# Patient Record
Sex: MALE | Race: WHITE | URBAN - METROPOLITAN AREA
[De-identification: names, ages, dates, MRNs, and addresses within clinical notes are randomized per-mention and may not be internally consistent; named-entity substitution may affect disease eponyms.]

---

## 2018-02-16 ENCOUNTER — TELEPHONE (OUTPATIENT)
Dept: CARDIOLOGY CLINIC | Age: 48
End: 2018-02-16

## 2018-02-19 ENCOUNTER — OFFICE VISIT (OUTPATIENT)
Dept: CARDIOLOGY CLINIC | Age: 48
End: 2018-02-19

## 2018-02-19 VITALS
BODY MASS INDEX: 43.82 KG/M2 | HEIGHT: 71 IN | HEART RATE: 81 BPM | SYSTOLIC BLOOD PRESSURE: 108 MMHG | DIASTOLIC BLOOD PRESSURE: 68 MMHG | OXYGEN SATURATION: 94 % | WEIGHT: 313 LBS

## 2018-02-19 DIAGNOSIS — R07.9 CHEST PAIN, UNSPECIFIED TYPE: ICD-10-CM

## 2018-02-19 DIAGNOSIS — R06.83 SNORING: ICD-10-CM

## 2018-02-19 DIAGNOSIS — R60.0 LOCALIZED EDEMA: Primary | ICD-10-CM

## 2018-02-19 DIAGNOSIS — I15.9 SECONDARY HYPERTENSION: ICD-10-CM

## 2018-02-19 PROCEDURE — 99204 OFFICE O/P NEW MOD 45 MIN: CPT | Performed by: INTERNAL MEDICINE

## 2018-02-19 RX ORDER — HYDROCHLOROTHIAZIDE 12.5 MG/1
12.5 TABLET ORAL DAILY
COMMUNITY

## 2018-02-19 RX ORDER — CHLORAL HYDRATE 500 MG
3000 CAPSULE ORAL DAILY
COMMUNITY

## 2018-02-19 RX ORDER — EZETIMIBE 10 MG/1
10 TABLET ORAL DAILY
COMMUNITY

## 2018-02-19 RX ORDER — POLYVINYL ALCOHOL 14 MG/ML
1 SOLUTION/ DROPS OPHTHALMIC PRN
COMMUNITY
Start: 2018-01-31 | End: 2018-03-02

## 2018-02-19 RX ORDER — LISINOPRIL 20 MG/1
20 TABLET ORAL DAILY
COMMUNITY

## 2018-02-19 RX ORDER — TOPIRAMATE 50 MG/1
50 TABLET, FILM COATED ORAL 2 TIMES DAILY
COMMUNITY

## 2018-02-19 RX ORDER — ATORVASTATIN CALCIUM 10 MG/1
5 TABLET, FILM COATED ORAL EVERY EVENING
Refills: 0 | COMMUNITY
Start: 2018-02-09

## 2018-02-19 NOTE — PROGRESS NOTES
[facility]   [facility]ation    Referring Provider:  Carmel Caballero MD     Chief Complaint   Patient presents with   [address] [facility] Patient     ED 01/29/18 x facial droop, possible stroke. No cardiac complaints    Hypertension    Chest Pain        History of Present Illness:   51 yo seen in the with facial droop and weakness down the right side of the body with possible stroke. He was diagnosed with possible [place] Palsey. He was evaluated at [facility] for these sx. Denies prior cardiac difficulties. He has a hx of prior  Chest discomfort and evaluated in the ER with Negative stress test by his hx. Denies chest discomfort, SOB, change in exercise tolerance, palpitations or syncope. Patient has been advised on the importance of regular exercise of at least 20-30 minutes daily alternating with aerobic and isometric activities. Denies smoking, drug or ETOH use. Past Medical History:  HTN, Hyperlipidemia    Surgical History:   has a past surgical history that includes back surgery (2007); knee surgery (Right, 2011); and shoulder surgery (Left, 2011). Social History:   reports that he quit smoking about 7 years ago. He has never used smokeless tobacco. He reports that he does not drink alcohol or use drugs.      Family History:  Positive for CAD--Father, grandmother     Home Medications:  Outpatient Encounter Prescriptions as of 2/19/2018   Medication Sig Dispense Refill    Omega-3 Fatty Acids (FISH OIL) 1000 MG CAPS Take 3,000 mg by mouth daily      lisinopril (PRINIVIL;ZESTRIL) 5 MG tablet Take 20 mg by mouth daily      ezetimibe (ZETIA) 10 MG tablet Take 10 mg by mouth daily      hydrochlorothiazide (HYDRODIURIL) 12.5 MG tablet Take 12.5 mg by mouth daily      aspirin 81 MG tablet Take 81 mg by mouth daily      topiramate (TOPAMAX SPRINKLE) 25 MG capsule Take 50 mg by mouth 2 times daily      polyvinyl alcohol (LIQUIFILM TEARS) 1.4 % ophthalmic solution Apply 1 drop to eye as needed  oxycodone (ROXICODONE) 30 MG immediate release tablet Take 15 mg by mouth every 4 hours as needed .  atorvastatin (LIPITOR) 10 MG tablet Take 5 mg by mouth every evening  0    duloxetine (CYMBALTA) 60 MG capsule Take 60 mg by mouth daily. No facility-administered encounter medications on file as of 2/19/2018. Allergies:  Review of patient's allergies indicates no known allergies. Review of Systems:   · Constitutional: there has been no unanticipated weight loss. There's been no change in energy level, sleep pattern, or activity level. · Eyes: No visual changes or diplopia. No scleral icterus. · ENT: No Headaches, hearing loss or vertigo. No mouth sores or sore throat. · Cardiovascular: Reviewed in HPI  · Respiratory: No cough or wheezing, no sputum production. No hematemesis. · Gastrointestinal: No abdominal pain, appetite loss, blood in stools. No change in bowel or bladder habits. · Genitourinary: No dysuria, trouble voiding, or hematuria. · Musculoskeletal:  No gait disturbance, weakness or joint complaints. · Integumentary: No rash or pruritis. · Neurological: No headache, diplopia, change in muscle strength, numbness or tingling. No change in gait, balance, coordination, mood, affect, memory, mentation, behavior. · Psychiatric: No anxiety, no depression. · Endocrine: No malaise, fatigue or temperature intolerance. No excessive thirst, fluid intake, or urination. No tremor. · Hematologic/Lymphatic: No abnormal bruising or bleeding, blood clots or swollen lymph nodes. Physical Examination:    Vitals:    02/19/18 1458   BP: 108/68   Pulse: 81   SpO2: 94%          Constitutional and General Appearance:   . NAD   SKIN:  .     Warm and dry  EYES:    .     EOMI  Neck:   .      Normal carotid contour  Respiratory:  · Normal excursion and expansion without use of accessory muscles  · Resp Auscultation: Normal breath sounds without dullness  Cardiovascular:  · The

## 2018-03-19 ENCOUNTER — HOSPITAL ENCOUNTER (OUTPATIENT)
Dept: NON INVASIVE DIAGNOSTICS | Age: 48
Discharge: HOME | End: 2018-03-19
Attending: INTERNAL MEDICINE | Admitting: INTERNAL MEDICINE

## 2018-03-19 DIAGNOSIS — R07.9 CHEST PAIN: ICD-10-CM

## 2018-03-19 LAB
LEFT VENTRICULAR EJECTION FRACTION HIGH VALUE: 60 %
LEFT VENTRICULAR EJECTION FRACTION MODE: NORMAL
LV EF: 55 %

## 2018-04-18 ENCOUNTER — OFFICE VISIT (OUTPATIENT)
Dept: SLEEP MEDICINE | Age: 48
End: 2018-04-18

## 2018-04-18 VITALS
OXYGEN SATURATION: 93 % | WEIGHT: 308.6 LBS | TEMPERATURE: 98.5 F | SYSTOLIC BLOOD PRESSURE: 128 MMHG | BODY MASS INDEX: 43.2 KG/M2 | DIASTOLIC BLOOD PRESSURE: 80 MMHG | HEIGHT: 71 IN | RESPIRATION RATE: 16 BRPM | HEART RATE: 79 BPM

## 2018-04-18 DIAGNOSIS — I51.7 RIGHT HEART ENLARGEMENT: ICD-10-CM

## 2018-04-18 DIAGNOSIS — G47.33 OSA (OBSTRUCTIVE SLEEP APNEA): Primary | ICD-10-CM

## 2018-04-18 DIAGNOSIS — Z86.79 H/O: HTN (HYPERTENSION): ICD-10-CM

## 2018-04-18 PROCEDURE — 99204 OFFICE O/P NEW MOD 45 MIN: CPT | Performed by: PSYCHIATRY & NEUROLOGY

## 2018-04-18 ASSESSMENT — SLEEP AND FATIGUE QUESTIONNAIRES
NECK CIRCUMFERENCE (INCHES): 19.5
HOW LIKELY ARE YOU TO NOD OFF OR FALL ASLEEP WHEN YOU ARE A PASSENGER IN A CAR FOR AN HOUR WITHOUT A BREAK: 0
HOW LIKELY ARE YOU TO NOD OFF OR FALL ASLEEP WHILE LYING DOWN TO REST IN THE AFTERNOON WHEN CIRCUMSTANCES PERMIT: 2
HOW LIKELY ARE YOU TO NOD OFF OR FALL ASLEEP WHILE WATCHING TV: 2
HOW LIKELY ARE YOU TO NOD OFF OR FALL ASLEEP WHILE SITTING AND TALKING TO SOMEONE: 0
HOW LIKELY ARE YOU TO NOD OFF OR FALL ASLEEP WHILE SITTING QUIETLY AFTER LUNCH WITHOUT ALCOHOL: 0
HOW LIKELY ARE YOU TO NOD OFF OR FALL ASLEEP WHILE SITTING INACTIVE IN A PUBLIC PLACE: 0
HOW LIKELY ARE YOU TO NOD OFF OR FALL ASLEEP IN A CAR, WHILE STOPPED FOR A FEW MINUTES IN TRAFFIC: 0
HOW LIKELY ARE YOU TO NOD OFF OR FALL ASLEEP WHILE SITTING AND READING: 2
ESS TOTAL SCORE: 6

## 2018-04-18 ASSESSMENT — ENCOUNTER SYMPTOMS
BACK PAIN: 1
GASTROINTESTINAL NEGATIVE: 1
ALLERGIC/IMMUNOLOGIC NEGATIVE: 1
COUGH: 1
EYES NEGATIVE: 1

## 2018-05-20 ENCOUNTER — HOSPITAL ENCOUNTER (OUTPATIENT)
Dept: SLEEP MEDICINE | Age: 48
Discharge: HOME | End: 2018-05-20
Attending: PSYCHIATRY & NEUROLOGY | Admitting: PSYCHIATRY & NEUROLOGY

## 2018-08-13 ENCOUNTER — OFFICE VISIT (OUTPATIENT)
Dept: CARDIOLOGY CLINIC | Age: 48
End: 2018-08-13

## 2018-08-13 VITALS
OXYGEN SATURATION: 94 % | BODY MASS INDEX: 42.88 KG/M2 | HEART RATE: 77 BPM | DIASTOLIC BLOOD PRESSURE: 74 MMHG | SYSTOLIC BLOOD PRESSURE: 130 MMHG | WEIGHT: 306.3 LBS | HEIGHT: 71 IN

## 2018-08-13 DIAGNOSIS — G51.0 BELL'S PALSY: ICD-10-CM

## 2018-08-13 DIAGNOSIS — R07.9 CHEST PAIN, UNSPECIFIED TYPE: Primary | ICD-10-CM

## 2018-08-13 DIAGNOSIS — R00.2 PALPITATIONS: ICD-10-CM

## 2018-08-13 DIAGNOSIS — R60.0 LOCALIZED EDEMA: ICD-10-CM

## 2018-08-13 DIAGNOSIS — R06.83 SNORING: ICD-10-CM

## 2018-08-13 PROCEDURE — 99214 OFFICE O/P EST MOD 30 MIN: CPT | Performed by: INTERNAL MEDICINE

## 2018-08-13 NOTE — PROGRESS NOTES
tablet Take 81 mg by mouth daily      topiramate (TOPAMAX) 50 MG tablet Take 50 mg by mouth 2 times daily      atorvastatin (LIPITOR) 10 MG tablet Take 5 mg by mouth every evening  0    oxyCODONE (ROXICODONE) 15 MG immediate release tablet Take 15 mg by mouth every 4 hours as needed .  [DISCONTINUED] duloxetine (CYMBALTA) 60 MG capsule Take 60 mg by mouth daily. No facility-administered encounter medications on file as of 8/13/2018. Allergies:  Patient has no known allergies. Review of Systems:   · Constitutional: there has been no unanticipated weight loss. There's been no change in energy level, sleep pattern, or activity level. · Eyes: No visual changes or diplopia. No scleral icterus. · ENT: No Headaches, hearing loss or vertigo. No mouth sores or sore throat. · Cardiovascular: Reviewed in HPI  · Respiratory: No cough or wheezing, no sputum production. No hematemesis. · Gastrointestinal: No abdominal pain, appetite loss, blood in stools. No change in bowel or bladder habits. · Genitourinary: No dysuria, trouble voiding, or hematuria. · Musculoskeletal:  No gait disturbance, weakness or joint complaints. · Integumentary: No rash or pruritis. · Neurological: No headache, diplopia, change in muscle strength, numbness or tingling. No change in gait, balance, coordination, mood, affect, memory, mentation, behavior. · Psychiatric: No anxiety, no depression. · Endocrine: No malaise, fatigue or temperature intolerance. No excessive thirst, fluid intake, or urination. No tremor. · Hematologic/Lymphatic: No abnormal bruising or bleeding, blood clots or swollen lymph nodes. Physical Examination:    Vitals:    08/13/18 1603   BP: 130/74   Pulse: 77   SpO2: 94%        Constitutional and General Appearance:   . NAD   SKIN:  .     Warm and dry  EYES:    .     EOMI  Neck:   .      Normal carotid contour  Respiratory:  · Normal excursion and expansion without use of accessory Dominic Ventura RN. Provider Kiera Bright is working as a scribe for and in the presence of me Felix Jasso MD). Working as a scribe, Laquita Sheila may have prepopulated components of this note with my historical  intellectual property under my direct supervision. Any additions to this intellectual property were performed in my presence and at my direction. Furthermore, the content and accuracy of this note have been reviewed by me Felix Jasso MD). Luis Fernando Degroot M.D., [facility] [place].

## 2018-08-13 NOTE — PATIENT INSTRUCTIONS
Heart monitor for 3 weeks to check for A fib  Regular exercise encouraged  Continue all current medications  Be sure to follow up on sleep study  OV in 1 year if monitor okay

## 2018-08-13 NOTE — COMMUNICATION BODY
tablet Take 81 mg by mouth daily      topiramate (TOPAMAX) 50 MG tablet Take 50 mg by mouth 2 times daily      atorvastatin (LIPITOR) 10 MG tablet Take 5 mg by mouth every evening  0    oxyCODONE (ROXICODONE) 15 MG immediate release tablet Take 15 mg by mouth every 4 hours as needed .  [DISCONTINUED] duloxetine (CYMBALTA) 60 MG capsule Take 60 mg by mouth daily. No facility-administered encounter medications on file as of 8/13/2018. Allergies:  Patient has no known allergies. Review of Systems:   · Constitutional: there has been no unanticipated weight loss. There's been no change in energy level, sleep pattern, or activity level. · Eyes: No visual changes or diplopia. No scleral icterus. · ENT: No Headaches, hearing loss or vertigo. No mouth sores or sore throat. · Cardiovascular: Reviewed in HPI  · Respiratory: No cough or wheezing, no sputum production. No hematemesis. · Gastrointestinal: No abdominal pain, appetite loss, blood in stools. No change in bowel or bladder habits. · Genitourinary: No dysuria, trouble voiding, or hematuria. · Musculoskeletal:  No gait disturbance, weakness or joint complaints. · Integumentary: No rash or pruritis. · Neurological: No headache, diplopia, change in muscle strength, numbness or tingling. No change in gait, balance, coordination, mood, affect, memory, mentation, behavior. · Psychiatric: No anxiety, no depression. · Endocrine: No malaise, fatigue or temperature intolerance. No excessive thirst, fluid intake, or urination. No tremor. · Hematologic/Lymphatic: No abnormal bruising or bleeding, blood clots or swollen lymph nodes. Physical Examination:    Vitals:    08/13/18 1603   BP: 130/74   Pulse: 77   SpO2: 94%        Constitutional and General Appearance:   . NAD   SKIN:  .     Warm and dry  EYES:    .     EOMI  Neck:   .      Normal carotid contour  Respiratory:  · Normal excursion and expansion without use of accessory muscles  · Resp Auscultation: Normal breath sounds without dullness  Cardiovascular:  · The apical impulses not displaced  · Heart tones are crisp and normal  · Cervical veins are not engorged  · Normal S1S2, No S3, No Murmur  · Peripheral pulses are symmetrical and full  Extremities:  · There is no clubbing, cyanosis of the extremities. · No edema  · Femoral Arteries: 2+ and equal  · Pedal Pulses: 2+ and equal   Abdomen:  · No masses or tenderness  · Liver/Spleen: No Abnormalities Noted  Neurological/Psychiatric:  · Alert and oriented in all spheres  · Moves all extremities well  · Exhibits normal gait balance and coordination  · No abnormalities of mood, affect, memory    Stress echo 3/19/18      Summary   Normal stress echocardiogram study.        Summary   -Normal left ventricle size, wall thickness and systolic function with an   estimated ejection fraction of 55-60%.  -There is abnormal septal motion. Diastolic filling parameters suggest grade   I diastolic dysfunction.   -Diastolic filling parameters suggest grade I diastolic dysfunction. E/e=   7.95.   -No evidence of significant valvular insufficiency.   -The right ventricle appears enlarged.   -Right ventricular systolic function appears normal.   -The right atrium appears mildly dilated.       Assessment:   Chest discomfort - Normal stress echo 3/19/18  Snoring/obesity/fatigue - had evaluation with Dr Harsh De Luna but hasn't had actual sleep study--Had to cancel andf not rescheduled. Stroke vs [place] Palsy - All testing negative for acute stroke      Plan:   MCOT monitor to evaluate paroxysmal fluttering in the chest.  Patient has been advised on the importance of regular exercise of at least 20-30 minutes daily alternating with aerobic and isometric activities. F/U sleep study. OV x 1 year. Thank you for allowing me to participate in the care of this individual.    Scribe's Attestation: This note was scribed in the presence of Dr. Lizeth Tyler MD by

## 2018-08-13 NOTE — LETTER
Home Medications:  Outpatient Encounter Prescriptions as of 8/13/2018   Medication Sig Dispense Refill    Omega-3 Fatty Acids (FISH OIL) 1000 MG CAPS Take 3,000 mg by mouth daily      lisinopril (PRINIVIL;ZESTRIL) 20 MG tablet Take 20 mg by mouth daily      ezetimibe (ZETIA) 10 MG tablet Take 10 mg by mouth daily      hydrochlorothiazide (HYDRODIURIL) 12.5 MG tablet Take 12.5 mg by mouth daily      aspirin 81 MG tablet Take 81 mg by mouth daily      topiramate (TOPAMAX) 50 MG tablet Take 50 mg by mouth 2 times daily      atorvastatin (LIPITOR) 10 MG tablet Take 5 mg by mouth every evening  0    oxyCODONE (ROXICODONE) 15 MG immediate release tablet Take 15 mg by mouth every 4 hours as needed .  [DISCONTINUED] duloxetine (CYMBALTA) 60 MG capsule Take 60 mg by mouth daily. No facility-administered encounter medications on file as of 8/13/2018. Allergies:  Patient has no known allergies. Review of Systems:   · Constitutional: there has been no unanticipated weight loss. There's been no change in energy level, sleep pattern, or activity level. · Eyes: No visual changes or diplopia. No scleral icterus. · ENT: No Headaches, hearing loss or vertigo. No mouth sores or sore throat. · Cardiovascular: Reviewed in HPI  · Respiratory: No cough or wheezing, no sputum production. No hematemesis. · Gastrointestinal: No abdominal pain, appetite loss, blood in stools. No change in bowel or bladder habits. · Genitourinary: No dysuria, trouble voiding, or hematuria. · Musculoskeletal:  No gait disturbance, weakness or joint complaints. · Integumentary: No rash or pruritis. · Neurological: No headache, diplopia, change in muscle strength, numbness or tingling. No change in gait, balance, coordination, mood, affect, memory, mentation, behavior. · Psychiatric: No anxiety, no depression. · Endocrine: No malaise, fatigue or temperature intolerance.  No excessive Stroke vs [place] Palsy - All testing negative for acute stroke      Plan:   MCOT monitor to evaluate paroxysmal fluttering in the chest.  Patient has been advised on the importance of regular exercise of at least 20-30 minutes daily alternating with aerobic and isometric activities. F/U sleep study. OV x 1 year. Thank you for allowing me to participate in the care of this individual.    Scribe's Attestation: This note was scribed in the presence of Dr. Giselle Munoz MD by Elham Marie RN. Provider Dyanbud Noguera is working as a scribe for and in the presence of me Eliana James MD). Working as a scribe, Jose Luis Walden may have prepopulated components of this note with my historical  intellectual property under my direct supervision. Any additions to this intellectual property were performed in my presence and at my direction. Furthermore, the content and accuracy of this note have been reviewed by me Eliana James MD). Devika Quinn M.D., [facility] [place]. If you have questions, please do not hesitate to call me. I look forward to following Hector Alonzo along with you.     Sincerely,        Sena Shukla MD

## 2018-08-30 ENCOUNTER — TELEPHONE (OUTPATIENT)
Dept: CARDIOLOGY CLINIC | Age: 48
End: 2018-08-30

## 2018-09-12 PROCEDURE — 93272 ECG/REVIEW INTERPRET ONLY: CPT | Performed by: INTERNAL MEDICINE

## 2018-09-18 ENCOUNTER — TELEPHONE (OUTPATIENT)
Dept: CARDIOLOGY CLINIC | Age: 48
End: 2018-09-18

## 2018-09-18 NOTE — TELEPHONE ENCOUNTER
Tell patient overall monitor looked good with most of his sx during periods of normal rhythm. He did have one short episode of fast beats from the lower chamber of the heart which was not symptomatic though would like him to see one of the EP doctors for evaluation and recommendations. Please call and schedule him EP appt to evaluate.

## 2018-10-25 ENCOUNTER — OFFICE VISIT (OUTPATIENT)
Dept: CARDIOLOGY CLINIC | Age: 48
End: 2018-10-25
Payer: COMMERCIAL

## 2018-10-25 VITALS
HEART RATE: 89 BPM | HEIGHT: 71 IN | DIASTOLIC BLOOD PRESSURE: 68 MMHG | BODY MASS INDEX: 41.64 KG/M2 | WEIGHT: 297.4 LBS | SYSTOLIC BLOOD PRESSURE: 106 MMHG

## 2018-10-25 DIAGNOSIS — I47.29 NSVT (NONSUSTAINED VENTRICULAR TACHYCARDIA): Primary | ICD-10-CM

## 2018-10-25 DIAGNOSIS — R06.83 SNORING: ICD-10-CM

## 2018-10-25 DIAGNOSIS — R00.2 PALPITATIONS: ICD-10-CM

## 2018-10-25 DIAGNOSIS — R07.9 CHEST PAIN, UNSPECIFIED TYPE: ICD-10-CM

## 2018-10-25 DIAGNOSIS — I15.9 SECONDARY HYPERTENSION: ICD-10-CM

## 2018-10-25 PROCEDURE — 93000 ELECTROCARDIOGRAM COMPLETE: CPT | Performed by: INTERNAL MEDICINE

## 2018-10-25 PROCEDURE — 99203 OFFICE O/P NEW LOW 30 MIN: CPT | Performed by: INTERNAL MEDICINE

## 2018-10-25 RX ORDER — COLCHICINE 0.6 MG/1
1 TABLET ORAL 2 TIMES DAILY
COMMUNITY
Start: 2018-04-22

## 2018-10-25 NOTE — PATIENT INSTRUCTIONS
doctor if you think you are having a problem with your medicine. When should you call for help? Call 911 anytime you think you may need emergency care. For example, call if:    · You passed out (lost consciousness).     · You have symptoms of a heart attack. These may include:  ¨ Chest pain or pressure, or a strange feeling in the chest.  ¨ Sweating. ¨ Shortness of breath. ¨ Pain, pressure, or a strange feeling in the back, neck, jaw, or upper belly or in one or both shoulders or arms. ¨ Lightheadedness or sudden weakness. ¨ A fast or irregular heartbeat. After you call 911, the  may tell you to chew 1 adult-strength or 2 to 4 low-dose aspirin. Wait for an ambulance. Do not try to drive yourself.     · You have symptoms of a stroke. These may include:  ¨ Sudden numbness, tingling, weakness, or loss of movement in your face, arm, or leg, especially on only one side of your body. ¨ Sudden vision changes. ¨ Sudden trouble speaking. ¨ Sudden confusion or trouble understanding simple statements. ¨ Sudden problems with walking or balance. ¨ A sudden, severe headache that is different from past headaches.    Call your doctor now or seek immediate medical care if:    · You have heart palpitations and:  ¨ Are dizzy or lightheaded, or you feel like you may faint. ¨ Have new or increased shortness of breath.    Watch closely for changes in your health, and be sure to contact your doctor if:    · You continue to have heart palpitations. Where can you learn more? Go to [email or web address] and sign in to your [employer] account. Enter R508 in the [employer] box to learn more about \"Palpitations: Care Instructions. \"     If you do not have an account, please click on the \"Sign Up Now\" link. Current as of: December 6, 2017  Content Version: 11.7  © 8318-8112 [employer]. Care instructions adapted under license by [employer] [facility] ([facility]).  If you have questions about a medical

## 2019-08-12 NOTE — PROGRESS NOTES
[facility]   Cardiac Consultation    Referring Provider:  Rochelle Quintero MD     Chief Complaint   Patient presents with    1 Year Follow Up     Patient here today for yearly follow up    Hypertension    Palpitations        History of Present Illness:   Mr Olivia Buck is seen as a yearly follow up. Last year was diagnosed with [place] Palsy. Had MCOT monitor for fluttering, which showed NSR, SVT, that was asymptomatic. Followed up with Dr Mustapha Hanna, no further testing/tx needed    Today he states he still has random \"vibrations\" in his chest,no exacerbating or alleviating factors. No associated symptoms. He has  Lost 40 pounds with diet and exercise and has less back pain, palpitations. He quit drinking pop, drinks tea and water. Walks on treadmill at [employer], no change in exercise tolerance. Recently took in his three grandchildren, which is stressful. Patient is compliant  with medication and is tolerating them well without side effects      Past Medical History:  HTN, Hyperlipidemia    Surgical History:   has a past surgical history that includes back surgery (2007); knee surgery (Right, 2011); and shoulder surgery (Left, 2011). Social History:   reports that he quit smoking about 9 years ago. He has never used smokeless tobacco. He reports that he does not drink alcohol or use drugs.      Family History:  Positive for CAD--Father, grandmother     Home Medications:  Outpatient Encounter Medications as of 8/13/2019   Medication Sig Dispense Refill    colchicine (COLCRYS) 0.6 MG tablet Take 1 tablet by mouth 2 times daily      Omega-3 Fatty Acids (FISH OIL) 1000 MG CAPS Take 3,000 mg by mouth daily      lisinopril (PRINIVIL;ZESTRIL) 20 MG tablet Take 20 mg by mouth daily      ezetimibe (ZETIA) 10 MG tablet Take 10 mg by mouth daily      hydrochlorothiazide (HYDRODIURIL) 12.5 MG tablet Take 12.5 mg by mouth daily      aspirin 81 MG tablet Take 81 mg by mouth daily      topiramate (TOPAMAX) 50 MG tablet Take 50 mg by mouth 2 times daily      atorvastatin (LIPITOR) 10 MG tablet Take 5 mg by mouth every evening  0    oxyCODONE (ROXICODONE) 15 MG immediate release tablet Take 15 mg by mouth every 4 hours as needed . No facility-administered encounter medications on file as of 8/13/2019. Allergies:  Patient has no known allergies. Review of Systems:   · Constitutional: there has been no unanticipated weight loss. There's been no change in energy level, sleep pattern, or activity level. · Eyes: No visual changes or diplopia. No scleral icterus. · ENT: No Headaches, hearing loss or vertigo. No mouth sores or sore throat. · Cardiovascular: Reviewed in HPI  · Respiratory: No cough or wheezing, no sputum production. No hematemesis. · Gastrointestinal: No abdominal pain, appetite loss, blood in stools. No change in bowel or bladder habits. · Genitourinary: No dysuria, trouble voiding, or hematuria. · Musculoskeletal:  No gait disturbance, weakness or joint complaints. · Integumentary: No rash or pruritis. · Neurological: No headache, diplopia, change in muscle strength, numbness or tingling. No change in gait, balance, coordination, mood, affect, memory, mentation, behavior. · Psychiatric: No anxiety, no depression. · Endocrine: No malaise, fatigue or temperature intolerance. No excessive thirst, fluid intake, or urination. No tremor. · Hematologic/Lymphatic: No abnormal bruising or bleeding, blood clots or swollen lymph nodes. Physical Examination:    Vitals:    08/13/19 0810   BP: 118/72   Pulse: 72   SpO2: 95%        Constitutional and General Appearance:   . NAD   SKIN:  .     Warm and dry  EYES:    .     EOMI  Neck:   . Normal carotid contour  Respiratory:  Normal excursion and expansion without use of accessory muscles  Resp Auscultation: Normal breath sounds without dullness  Cardiovascular:   The apical impulses not displaced  Heart tones are crisp

## 2019-08-13 ENCOUNTER — OFFICE VISIT (OUTPATIENT)
Dept: CARDIOLOGY CLINIC | Age: 49
End: 2019-08-13
Payer: COMMERCIAL

## 2019-08-13 VITALS
HEART RATE: 72 BPM | DIASTOLIC BLOOD PRESSURE: 72 MMHG | OXYGEN SATURATION: 95 % | BODY MASS INDEX: 38.64 KG/M2 | WEIGHT: 276 LBS | SYSTOLIC BLOOD PRESSURE: 118 MMHG | HEIGHT: 71 IN

## 2019-08-13 DIAGNOSIS — R00.2 PALPITATIONS: ICD-10-CM

## 2019-08-13 DIAGNOSIS — E78.49 OTHER HYPERLIPIDEMIA: ICD-10-CM

## 2019-08-13 DIAGNOSIS — I15.9 SECONDARY HYPERTENSION: Primary | ICD-10-CM

## 2019-08-13 DIAGNOSIS — R06.83 SNORING: ICD-10-CM

## 2019-08-13 PROCEDURE — 99213 OFFICE O/P EST LOW 20 MIN: CPT | Performed by: INTERNAL MEDICINE

## 2020-01-30 ENCOUNTER — OFFICE VISIT (OUTPATIENT)
Dept: CARDIOLOGY CLINIC | Age: 50
End: 2020-01-30
Payer: COMMERCIAL

## 2020-01-30 VITALS
WEIGHT: 256 LBS | HEART RATE: 93 BPM | HEIGHT: 71 IN | OXYGEN SATURATION: 96 % | BODY MASS INDEX: 35.84 KG/M2 | DIASTOLIC BLOOD PRESSURE: 56 MMHG | SYSTOLIC BLOOD PRESSURE: 100 MMHG

## 2020-01-30 PROBLEM — E66.01 CLASS 3 SEVERE OBESITY IN ADULT (HCC): Status: ACTIVE | Noted: 2020-01-30

## 2020-01-30 PROBLEM — I82.4Z9 DEEP VEIN THROMBOSIS (DVT) OF DISTAL VEIN OF LOWER EXTREMITY (HCC): Status: ACTIVE | Noted: 2020-01-30

## 2020-01-30 PROBLEM — I26.99 PULMONARY EMBOLISM (HCC): Status: ACTIVE | Noted: 2020-01-30

## 2020-01-30 PROBLEM — E78.5 HYPERLIPIDEMIA: Status: ACTIVE | Noted: 2019-08-13

## 2020-01-30 PROBLEM — E66.813 CLASS 3 SEVERE OBESITY IN ADULT: Status: ACTIVE | Noted: 2020-01-30

## 2020-01-30 PROCEDURE — 99213 OFFICE O/P EST LOW 20 MIN: CPT | Performed by: INTERNAL MEDICINE

## 2020-01-30 RX ORDER — RIVAROXABAN 20 MG/1
20 TABLET, FILM COATED ORAL DAILY
COMMUNITY
Start: 2020-01-21

## 2020-01-30 RX ORDER — RIVAROXABAN 15 MG-20MG
KIT ORAL
COMMUNITY
Start: 2020-01-20 | End: 2020-07-30

## 2020-01-30 NOTE — PROGRESS NOTES
[facility]   Cardiac Consultation    Referring Provider:  Belinda Sacks, MD     Chief Complaint   Patient presents with    Hypertension        History of Present Illness:   Mr Shiraz Tai is seen as a follow up. He was seen for palpitations. Last week admitted and treated for PE, DVT. No recent surgery prior clotting  Today, he has unchanged exertional sob alleviated with rest. He has no chest pain, palpitations dizziness or syncope   Still has back pain, which he has been seeing chiropractor for    Patient is compliant  with medication and is tolerating them well without side effects    Past Medical History:  HTN, Hyperlipidemia    Surgical History:   has a past surgical history that includes back surgery (2007); knee surgery (Right, 2011); and shoulder surgery (Left, 2011). Social History:   reports that he quit smoking about 9 years ago. He has never used smokeless tobacco. He reports that he does not drink alcohol or use drugs. Family History:  Positive for CAD--Father, grandmother     Home Medications:  Outpatient Encounter Medications as of 1/30/2020   Medication Sig Dispense Refill    XARELTO STARTER PACK 15 & 20 MG Starter Pack TAKE ONE (15MG) TABLET TWICE DAILY FOR 21 DAYS THEN TAKE ONE (20MG) TABLET DAILY      colchicine (COLCRYS) 0.6 MG tablet Take 1 tablet by mouth 2 times daily      Omega-3 Fatty Acids (FISH OIL) 1000 MG CAPS Take 3,000 mg by mouth daily      lisinopril (PRINIVIL;ZESTRIL) 20 MG tablet Take 20 mg by mouth daily      ezetimibe (ZETIA) 10 MG tablet Take 10 mg by mouth daily      hydrochlorothiazide (HYDRODIURIL) 12.5 MG tablet Take 12.5 mg by mouth daily      aspirin 81 MG tablet Take 81 mg by mouth daily      topiramate (TOPAMAX) 50 MG tablet Take 50 mg by mouth 2 times daily      atorvastatin (LIPITOR) 10 MG tablet Take 5 mg by mouth every evening  0    oxyCODONE (ROXICODONE) 15 MG immediate release tablet Take 15 mg by mouth every 4 hours as needed .  XARELTO 20 MG TABS tablet        No facility-administered encounter medications on file as of 1/30/2020. Allergies:  Patient has no known allergies. Review of Systems:   · Constitutional: there has been no unanticipated weight loss. There's been no change in energy level, sleep pattern, or activity level. · Eyes: No visual changes or diplopia. No scleral icterus. · ENT: No Headaches, hearing loss or vertigo. No mouth sores or sore throat. · Cardiovascular: Reviewed in HPI  · Respiratory: No cough or wheezing, no sputum production. No hematemesis. · Gastrointestinal: No abdominal pain, appetite loss, blood in stools. No change in bowel or bladder habits. · Genitourinary: No dysuria, trouble voiding, or hematuria. · Musculoskeletal:  No gait disturbance, weakness or joint complaints. · Integumentary: No rash or pruritis. · Neurological: No headache, diplopia, change in muscle strength, numbness or tingling. No change in gait, balance, coordination, mood, affect, memory, mentation, behavior. · Psychiatric: No anxiety, no depression. · Endocrine: No malaise, fatigue or temperature intolerance. No excessive thirst, fluid intake, or urination. No tremor. · Hematologic/Lymphatic: No abnormal bruising or bleeding, blood clots or swollen lymph nodes. Physical Examination:    Vitals:    01/30/20 1325   BP: (!) 100/56   Pulse: 93   SpO2: 96%        Constitutional and General Appearance:   . NAD   SKIN:  .     Warm and dry  EYES:    .     EOMI  Neck:   . Normal carotid contour  Respiratory:  Normal excursion and expansion without use of accessory muscles  Resp Auscultation: Normal breath sounds without dullness  Cardiovascular: The apical impulses not displaced  Heart tones are crisp and normal  Cervical veins are not engorged  Normal S1S2, No S3, No Murmur  Peripheral pulses are symmetrical and full  Extremities: There is no clubbing, cyanosis of the extremities.   No edema  Femoral Arteries: 2+ and equal  Pedal Pulses: 2+ and equal   Abdomen:  No masses or tenderness  Liver/Spleen: No Abnormalities Noted  Neurological/Psychiatric:  Alert and oriented in all spheres  Moves all extremities well  Exhibits normal gait balance and coordination  No abnormalities of mood, affect, memory    All testing and labs listed below were personally reviewed. 3/19/18  Stress echo-nl    Assessment:   DVT (left upper thigh)/PE ([place]) TPA, heparin Xarelto  Diagnosed 1/2020 8/24/2018 event monitor--no afib  On xarelto --lifelong per ER doc  On asa 81 mg daily  Creat clearance 109 ml/min  12/30/2019  Creat 1.1 k 3.4  Refer to hematology for clotting eval    palpitations  NSVT--14 beats at 124  8/15/19-9/13/18  MCOT  NSR with PVC burden of less than 1%  VT noted for 14 beats at 124bpm. No AF. HR [id number]) symptoms reported with SR no symptoms with VT  Asymptomatic      HTN  BP (!) 100/56   Pulse 93   Ht 5' 11\" (1.803 m)   Wt 256 lb (116.1 kg)   SpO2 96%   BMI 35.70 kg/m²   Tolerates lisinopirl and hctz    Hyperlipidemia  5/4/2018  ast 79 ast 116  5/24/2018 tc 141 tri 147  hdl 36 ldl 76  8/8/2018  ast 40 alt 61  Tolerates lipitor and zetia    Obesity   Losing weight    Snoring  Referred for sleep study    Plan:   Venous doppler bilateral legs 6months( swelling, blood clots)  Refer to Dr Akiko Quijano for clotting evaluation  Follow up in 6 months    Thank you for allowing me to participate in the care of this individual.    Scribe Attestation:  Obey Styles am scribing for and in the presence of Kecia Cates MD.   Maria Dolores Wesley 01/30/20 1:37 PM   Provider Angel Light is working as a scribe for and in the presence of me (Kecia Cates MD). Working as a scribe, Oquendo Dian may have prepopulated components of this note with my historical  intellectual property under my direct supervision.   Any additions to this intellectual property were performed in my presence and at my

## 2020-07-30 ENCOUNTER — OFFICE VISIT (OUTPATIENT)
Dept: CARDIOLOGY CLINIC | Age: 50
End: 2020-07-30
Payer: COMMERCIAL

## 2020-07-30 VITALS
DIASTOLIC BLOOD PRESSURE: 70 MMHG | HEART RATE: 72 BPM | BODY MASS INDEX: 40.02 KG/M2 | SYSTOLIC BLOOD PRESSURE: 110 MMHG | WEIGHT: 285.9 LBS | HEIGHT: 71 IN

## 2020-07-30 PROCEDURE — 99213 OFFICE O/P EST LOW 20 MIN: CPT | Performed by: INTERNAL MEDICINE

## 2020-07-30 NOTE — PROGRESS NOTES
[facility]   Cardiac Consultation    Referring Provider:  Juan Barillas MD     Chief Complaint   Patient presents with    Hypertension        History of Present Illness:   Mr Antonio Jarquin is seen as a follow up for treatment of for PE, DVT. He has a  History of SVT  He was evaluated by hematology,  Still has left dvt. Did not do the sleep study. Today, he has unchanged exertional sob alleviated with rest. He has no chest pain, palpitations dizziness or syncope    He has gained  Weight through the  COVID-19 closures, but has started to exercise recently    Patient is compliant  with medication and is tolerating them well without side effects    Past Medical History:  HTN, Hyperlipidemia    Surgical History:   has a past surgical history that includes back surgery (2007); knee surgery (Right, 2011); and shoulder surgery (Left, 2011). Social History:   reports that he quit smoking about 10 years ago. He has never used smokeless tobacco. He reports that he does not drink alcohol or use drugs. Family History:  Positive for CAD--Father, grandmother     Home Medications:  Outpatient Encounter Medications as of 7/30/2020   Medication Sig Dispense Refill    XARELTO 20 MG TABS tablet       colchicine (COLCRYS) 0.6 MG tablet Take 1 tablet by mouth 2 times daily      Omega-3 Fatty Acids (FISH OIL) 1000 MG CAPS Take 3,000 mg by mouth daily      lisinopril (PRINIVIL;ZESTRIL) 20 MG tablet Take 20 mg by mouth daily      ezetimibe (ZETIA) 10 MG tablet Take 10 mg by mouth daily      hydrochlorothiazide (HYDRODIURIL) 12.5 MG tablet Take 12.5 mg by mouth daily      aspirin 81 MG tablet Take 81 mg by mouth daily      topiramate (TOPAMAX) 50 MG tablet Take 50 mg by mouth 2 times daily      atorvastatin (LIPITOR) 10 MG tablet Take 5 mg by mouth every evening  0    oxyCODONE (ROXICODONE) 15 MG immediate release tablet Take 15 mg by mouth every 4 hours as needed .       [DISCONTINUED] Fahad Bailon PACK 15 & 20 MG Starter Pack TAKE ONE (15MG) TABLET TWICE DAILY FOR 21 DAYS THEN TAKE ONE (20MG) TABLET DAILY       No facility-administered encounter medications on file as of 7/30/2020. Allergies:  Patient has no known allergies. Review of Systems:   · Constitutional: there has been no unanticipated weight loss. There's been no change in energy level, sleep pattern, or activity level. · Eyes: No visual changes or diplopia. No scleral icterus. · ENT: No Headaches, hearing loss or vertigo. No mouth sores or sore throat. · Cardiovascular: Reviewed in HPI  · Respiratory: No cough or wheezing, no sputum production. No hematemesis. · Gastrointestinal: No abdominal pain, appetite loss, blood in stools. No change in bowel or bladder habits. · Genitourinary: No dysuria, trouble voiding, or hematuria. · Musculoskeletal:  No gait disturbance, weakness or joint complaints. · Integumentary: No rash or pruritis. · Neurological: No headache, diplopia, change in muscle strength, numbness or tingling. No change in gait, balance, coordination, mood, affect, memory, mentation, behavior. · Psychiatric: No anxiety, no depression. · Endocrine: No malaise, fatigue or temperature intolerance. No excessive thirst, fluid intake, or urination. No tremor. · Hematologic/Lymphatic: No abnormal bruising or bleeding, blood clots or swollen lymph nodes. Physical Examination:    Vitals:    07/30/20 0916   BP: 110/70   Pulse: 72        Constitutional and General Appearance:   . NAD   SKIN:  .     Warm and dry  EYES:    .     EOMI  Neck:   . Normal carotid contour  Respiratory:  Normal excursion and expansion without use of accessory muscles  Resp Auscultation: Normal breath sounds without dullness  Cardiovascular: The apical impulses not displaced  Heart tones are crisp and normal  Cervical veins are not engorged  Normal S1S2, No S3, No Murmur  Peripheral pulses are symmetrical and full  Extremities:   There Melita Myrick may have prepopulated components of this note with my historical  intellectual property under my direct supervision. Any additions to this intellectual property were performed in my presence and at my direction. Furthermore, the content and accuracy of this note have been reviewed by me Sanford Che MD). Ritchie Banerjee M.D., [facility].

## 2021-02-04 ENCOUNTER — OFFICE VISIT (OUTPATIENT)
Dept: ORTHOPEDIC SURGERY | Age: 51
End: 2021-02-04
Payer: COMMERCIAL

## 2021-02-04 VITALS — TEMPERATURE: 98.6 F | HEIGHT: 71 IN | WEIGHT: 274 LBS | BODY MASS INDEX: 38.36 KG/M2

## 2021-02-04 DIAGNOSIS — G89.29 CHRONIC RIGHT SHOULDER PAIN: Primary | ICD-10-CM

## 2021-02-04 DIAGNOSIS — M25.511 CHRONIC RIGHT SHOULDER PAIN: Primary | ICD-10-CM

## 2021-02-04 DIAGNOSIS — M79.601 ARM PAIN, RIGHT: ICD-10-CM

## 2021-02-04 DIAGNOSIS — M54.12 CERVICAL RADICULOPATHY: ICD-10-CM

## 2021-02-04 PROCEDURE — 99203 OFFICE O/P NEW LOW 30 MIN: CPT | Performed by: ORTHOPAEDIC SURGERY

## 2021-02-04 NOTE — PROGRESS NOTES
CHIEF COMPLAINT: Right shoulder pain    DATE OF INJURY: 1/2/21    History:    Moisés Lamb is a 48 y.o. right handed male self-referred for evaluation and treatment of Right shoulder pain. He does have a history of right shoulder rotator cuff repair in 2011. This is evaluated as a personal injury. The pain began 1 month ago. Pain is rated as a 6/10. There was an injury. He was underneath of a car tugging on a bolt and then had pain afterwards. Pain is located laterally. Does have pain that radiates down toward his elbow. He states he feels a popping sensation when he is flexing and extending his shoulder. He states that he has been having numbness and tingling in his fingers as well as feeling of coldness. He states he has been having weakness with . The patient has had 1 session of [facility]. He states the physical therapist did not schedule him for more visits and thought that he might need evaluation of his cervical spine. The patient has had an injection with no relief. He is on chronic oxycodone 15 mg. 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     KNEE SURGERY Right 2011    SHOULDER SURGERY Left 2011       Current Outpatient Medications on File Prior to Visit   Medication Sig Dispense Refill    predniSONE (DELTASONE) 20 MG tablet Take 40 mg by mouth daily      methocarbamol (ROBAXIN) 750 MG tablet Take 750 mg by mouth 3 times daily as needed      esomeprazole (NEXIUM) 40 MG delayed release capsule Take 1 capsule by mouth daily      lidocaine (LIDODERM) 5 % Place 1 patch onto the skin daily 12 hours on, 12 hours off.  XARELTO 20 MG TABS tablet       colchicine (COLCRYS) 0.6 MG tablet Take 1 tablet by mouth 2 times daily      Omega-3 Fatty Acids (FISH OIL) 1000 MG CAPS Take 3,000 mg by mouth daily      lisinopril (PRINIVIL;ZESTRIL) 20 MG tablet Take 20 mg by mouth daily      ezetimibe (ZETIA) 10 MG tablet Take 10 mg by mouth daily      hydrochlorothiazide (HYDRODIURIL) 12.5 MG tablet Take 12.5 mg by mouth daily      aspirin 81 MG tablet Take 81 mg by mouth daily      topiramate (TOPAMAX) 50 MG tablet Take 50 mg by mouth 2 times daily      atorvastatin (LIPITOR) 10 MG tablet Take 5 mg by mouth every evening  0    oxyCODONE (ROXICODONE) 15 MG immediate release tablet Take 15 mg by mouth 3 times daily as needed. No current facility-administered medications on file prior to visit.         No Known Allergies    Social History     Socioeconomic History    Marital status:      Spouse name: Not on file    Number of children: Not on file    Years of education: Not on file    Highest education level: Not on file   Occupational History    Not on file   Social Needs    Financial resource strain: Not on file    Food insecurity     Worry: Not on file     Inability: Not on file    Transportation needs     Medical: Not on file     Non-medical: Not on file   Tobacco Use    Smoking status: Former Smoker     Quit date: 4/20/2010     Years since quitting: 10.8    Smokeless tobacco: Never Used   Substance and Sexual Activity    Alcohol use: No    Drug use: No    Sexual activity: Not on file   Lifestyle    Physical activity     Days per week: Not on file     Minutes per session: Not on file    Stress: Not on file   Relationships    Social connections     Talks on phone: Not on file     Gets together: Not on file     Attends Restorationism service: Not on file     Active member of club or organization: Not on file     Attends meetings of clubs or organizations: Not on file     Relationship status: Not on file    Intimate partner violence     Fear of current or ex partner: Not on file     Emotionally abused: Not on file     Physically abused: Not on file     Forced sexual activity: Not on file   Other Topics Concern    Not on file   Social History Narrative    Not on file       Family History   Problem Relation Age of Onset    Cancer Mother     Migraines Mother     Heart Disease Father     Hypertension Father     Diabetes Father     Stroke Father     Heart Disease Maternal Grandmother     Diabetes Paternal Grandmother        Review of Systems:   I have reviewed the clinically relevant past medical history, medications, allergies, family history, social history, and 13 point Review of Systems from the patient's recent history form & documented any details relevant to today's presenting complaints in the history above. The patient's self-reported past medical history, medications, allergies, family history, social history, and Review of Systems form from 2/4/21 have been scanned into the chart under the \"Media\" tab. Physical Examination:      Vital signs:  Temp 98.6 °F (37 °C) (Infrared)   Ht 5' 11\" (1.803 m)   Wt 274 lb (124.3 kg)   BMI 38.22 kg/m²     General:   alert, appears stated age, cooperative and no distress   Right Shoulder   Active ROM:   forward flexion 170-180, external rotation 70, internal rotation L4. Left shoulder: forward flexion 180, external rotation 80, internal rotation L4.       Passive ROM:  forward flexion 180    Joint Tenderness:   posterior acromial   Neer:   positive posterior   Clemente:   negative   Strength:   5/5 Supraspinatus, External rotation    Bilateral shoulders   Drop-arm test:   negative   Belly-press test:   negative   Bear-hug test:   negative   Speed's test:   negative   Bicipital groove tenderness:  positive   Rivera's test:   negative   Cross-body adduction test:   negative    AC joint tenderness:   negative   When I have him actively forward flex

## 2021-02-08 ENCOUNTER — TELEPHONE (OUTPATIENT)
Dept: ORTHOPEDIC SURGERY | Age: 51
End: 2021-02-08

## 2021-02-08 NOTE — TELEPHONE ENCOUNTER
L/M FOR PATIENT regarding MRI CSP approval and authorization being valid until 4/5/2021. Patient was instructed that his MRI is scheduled for 2/9/2021 with a 7:15pm arrival to [facility]. Patient currently has a NEW PATIENT appointment schedule for 2/12/2021 at [facility] with Gia Byrne PA-C as discussed at his 2/4/2021 appointment. Advised to contact the office if needing to reschedule this to accommodate MRI scan.

## 2021-02-11 DIAGNOSIS — M54.12 CERVICAL RADICULOPATHY: Primary | ICD-10-CM

## 2021-02-11 NOTE — TELEPHONE ENCOUNTER
Attempted to contact patient, however chart does not provide a working, reachable number for the patient. His chart indicates he is active on [employer]. Therefore, the following message has been sent to him via that communication route:     Me to Denice Khoury         2/11/21 10:50 AM    Vandana Paz,      I tried to contact you by phone this morning; however, the numbers in your chart state they are either not accepting calls at this time or the number is not in service.      Dr. Marylu Cowden received your MRI results this morning and would like to forego your appointment for tomorrow - 2/12/2021 with Raciel Tavera PA-C. Rather he wishes to proceed with an EMG of your right arm to take a look at the functioning of your nerves. We have placed this referral in your chart and need you to contact Dr. Camilo Patel office at [phone] to schedule the EMG. Once you have that scheduled, please make an appointment with Dr. Marylu Cowden to review the results of both that and the MRI and proceed with a specified treatment plan based on those results.      I will cancel your appointment with [facility] tomorrow.      Thank you,      Dr. Yasemin Martell staff     This [employer] message has not been read.

## 2021-02-12 ENCOUNTER — TELEPHONE (OUTPATIENT)
Dept: NEUROLOGY | Age: 51
End: 2021-02-12

## 2021-02-18 ENCOUNTER — PROCEDURE VISIT (OUTPATIENT)
Dept: NEUROLOGY | Age: 51
End: 2021-02-18
Payer: COMMERCIAL

## 2021-02-18 DIAGNOSIS — R20.0 RIGHT ARM NUMBNESS: Primary | ICD-10-CM

## 2021-02-18 PROCEDURE — 95909 NRV CNDJ TST 5-6 STUDIES: CPT | Performed by: PSYCHIATRY & NEUROLOGY

## 2021-02-18 PROCEDURE — 95886 MUSC TEST DONE W/N TEST COMP: CPT | Performed by: PSYCHIATRY & NEUROLOGY

## 2021-02-18 NOTE — PROGRESS NOTES
Shane Marcus M.D. [facility]) Physicians/[facility]  Board Certified in [address] [facility] [address] [facility], [address] [facility], [address] [facility]    EMG / NERVE CONDUCTION STUDY    PATIENT:     Loida Middleton      DATE OF EM2021    YOB: 1970       REASON FOR EMG:   Right arm pain and numbness    REFERRING PHYSICIAN:  Manohar Castro MD  [address] [facility]. [facility]    SUMMARY:   The right median motor and sensory nerve studies with prolonged distal latencies. The right ulnar motor nerve study had a mild slowing of conduction velocity in the forearm but was normal across the elbow. The right ulnar sensory nerve study had a borderline amplitude. The right radial sensory nerve study was normal.  Needle EMG of several muscles in the right upper extremity was normal.  Needle EMG of the right cervical paraspinal muscles was normal.     CLINICAL DIAGNOSIS:    Cervical radiculopathy       EMG RESULTS:   1. This patient has a moderately severe right median nerve lesion at the wrist.  (Carpal tunnel syndrome). 2.  There is also mild slowing of the ulnar nerve conduction velocity in the forearm. This is a nonspecific finding and can be seen in early mild generalized neuropathy or even in prediabetics. Clinical correlation is recommended. 3.  There is no electrophysiological evidence for cervical radiculopathy. _____________________________  Shane Marcus M.D.   Electromyographer/Neurologist

## 2021-02-22 ENCOUNTER — TELEPHONE (OUTPATIENT)
Dept: ORTHOPEDIC SURGERY | Age: 51
End: 2021-02-22

## 2021-02-22 NOTE — TELEPHONE ENCOUNTER
L/M FOR PATIENT requesting he contact the office to schedule a follow up appointment with [facility] to review EMG and MRI results.

## 2021-02-23 ENCOUNTER — OFFICE VISIT (OUTPATIENT)
Dept: ORTHOPEDIC SURGERY | Age: 51
End: 2021-02-23
Payer: COMMERCIAL

## 2021-02-23 VITALS — BODY MASS INDEX: 38.36 KG/M2 | TEMPERATURE: 97.4 F | HEIGHT: 71 IN | WEIGHT: 274 LBS

## 2021-02-23 DIAGNOSIS — G56.01 CARPAL TUNNEL SYNDROME OF RIGHT WRIST: Primary | ICD-10-CM

## 2021-02-23 PROCEDURE — 99213 OFFICE O/P EST LOW 20 MIN: CPT | Performed by: ORTHOPAEDIC SURGERY

## 2021-02-23 PROCEDURE — L3908 WHO COCK-UP NONMOLDE PRE OTS: HCPCS | Performed by: ORTHOPAEDIC SURGERY

## 2021-02-23 NOTE — PROGRESS NOTES
CHIEF COMPLAINT: Right shoulder pain    DATE OF INJURY: 1/2/21    History:    Deyvi Clayton is a 48 y.o. right handed male here for right arm follow-up. Initial history:  self-referred for evaluation and treatment of Right shoulder pain. He does have a history of right shoulder rotator cuff repair in 2011. This is evaluated as a personal injury. The pain began 1 month ago. Pain is rated as a 6/10. There was an injury. He was underneath of a car tugging on a bolt and then had pain afterwards. Pain is located laterally. Does have pain that radiates down toward his elbow. He states he feels a popping sensation when he is flexing and extending his shoulder. He states that he has been having numbness and tingling in his fingers as well as feeling of coldness. He states he has been having weakness with . The patient has had 1 session of [facility]. He states the physical therapist did not schedule him for more visits and thought that he might need evaluation of his cervical spine. The patient has had an injection with no relief. He is on chronic oxycodone 15 mg. 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 He rates pain 6/10. He notes occasional numbness and tingling within his hand and fingers. He states that this has been present since the beginning of the year. Past Medical History:   Diagnosis Date    Arthritis     Back problem     Chronic kidney disease     Diabetes mellitus ([facility] [address]     Heart disease     Migraines     Stroke [facility])        Past Surgical History:   Procedure Laterality Date    BACK SURGERY  2007    back injury at work, 2 cages and 2 rods, 4 screws.     KNEE SURGERY Right 2011    SHOULDER SURGERY Right 2011    RTC repair       Current Outpatient Medications on File Prior to Visit   Medication Sig Dispense Refill    predniSONE (DELTASONE) 20 MG tablet Take 40 mg by mouth daily      methocarbamol (ROBAXIN) 750 MG tablet Take 750 mg by mouth 3 times daily as needed      esomeprazole (NEXIUM) 40 MG delayed release capsule Take 1 capsule by mouth daily      lidocaine (LIDODERM) 5 % Place 1 patch onto the skin daily 12 hours on, 12 hours off.  XARELTO 20 MG TABS tablet       colchicine (COLCRYS) 0.6 MG tablet Take 1 tablet by mouth 2 times daily      Omega-3 Fatty Acids (FISH OIL) 1000 MG CAPS Take 3,000 mg by mouth daily      lisinopril (PRINIVIL;ZESTRIL) 20 MG tablet Take 20 mg by mouth daily      ezetimibe (ZETIA) 10 MG tablet Take 10 mg by mouth daily      hydrochlorothiazide (HYDRODIURIL) 12.5 MG tablet Take 12.5 mg by mouth daily      aspirin 81 MG tablet Take 81 mg by mouth daily      topiramate (TOPAMAX) 50 MG tablet Take 50 mg by mouth 2 times daily      atorvastatin (LIPITOR) 10 MG tablet Take 5 mg by mouth every evening  0    oxyCODONE (ROXICODONE) 15 MG immediate release tablet Take 15 mg by mouth 3 times daily as needed. No current facility-administered medications on file prior to visit.         No Known Allergies    Social History     Socioeconomic History    Marital status:      Spouse name: Not on file    Number of children: Not on file    Years of education: Not on file    Highest education level: Not on file   Occupational History    Not on file   Social Needs    Financial resource strain: Not on file    Food insecurity     Worry: Not on file     Inability: Not on file    Transportation needs     Medical: Not on file     Non-medical: Not on file   Tobacco Use    Smoking status: Former Smoker     Quit date: 4/20/2010     Years since quitting: 10.8    Smokeless tobacco: Never Used   Substance and Sexual Activity    Alcohol use: No    Drug use: No    Sexual activity: Not on file   Lifestyle    Physical activity     Days per week: Not on file     Minutes per session: Not on file    Stress: Not on file   Relationships    Social connections     Talks on phone: Not on file Gets together: Not on file     Attends Taoist service: Not on file     Active member of club or organization: Not on file     Attends meetings of clubs or organizations: Not on file     Relationship status: Not on file    Intimate partner violence     Fear of current or ex partner: Not on file     Emotionally abused: Not on file     Physically abused: Not on file     Forced sexual activity: Not on file   Other Topics Concern    Not on file   Social History Narrative    Not on file       Family History   Problem Relation Age of Onset    Cancer Mother     Migraines Mother     Heart Disease Father     Hypertension Father     Diabetes Father     Stroke Father     Heart Disease Maternal Grandmother     Diabetes Paternal Grandmother        Review of Systems:   I have reviewed the clinically relevant past medical history, medications, allergies, family history, social history, and 13 point Review of Systems from the patient's recent history form & documented any details relevant to today's presenting complaints in the history above. The patient's self-reported past medical history, medications, allergies, family history, social history, and Review of Systems form from 2/4/21 have been scanned into the chart under the \"Media\" tab. Physical Examination:      Vital signs:  Temp 97.4 °F (36.3 °C)   Ht 5' 11\" (1.803 m)   Wt 274 lb (124.3 kg)   BMI 38.22 kg/m²     General:   alert, appears stated age, cooperative and no distress   Right Shoulder   Active ROM:   forward flexion 170-180, external rotation 70, internal rotation L4. Left shoulder: forward flexion 180, external rotation 80, internal rotation L4. Passive ROM:  forward flexion 180    Joint Tenderness:   posterior acromial   Neer:   positive posterior   Clemente:   negative   Strength:   5/5 Supraspinatus, External rotation    Bilateral shoulders     Carpal tunnel compression test was negative. Phalen's test was negative.   No significant muscle atrophy within his hand. There are no skin lesions, cellulitis, or extreme edema in the upper extremities. Sensation is grossly intact to light touch bilaterally upper extremity. The patient has warm and well-perfused Bilateral upper extremities with brisk capillary refill. Imaging   Right Shoulder X-Ray 21:  AC Joint: narrowing moderate  Glenohumeral joint: no abnormalities noted  Elevation humeral head: absent    Cervical spine xrays 21:   Multilevel degenerative changes. At C6-7 there is disc space narrowing. Cervical spine MRI: I independently reviewed the images, as well as the radiology report. 1. Degenerative disc disease involving the cervical spine without evidence of cervical disc    herniation or cervical spinal stenosis or cervical cord compression. 2. Uncovertebral osteophytes producing mild bilateral C4-5, mild bilateral C5 6 and mild    bilateral C6-7 foraminal stenosis. EM. this patient has a moderately severe right median nerve lesion at the wrist.  (Carpal tunnel syndrome). 2.  There is also mild slowing of the ulnar nerve conduction velocity in the forearm. This is a nonspecific finding and can be seen in early mild generalized neuropathy or even in prediabetics. Clinical correlation recommended. 3.  There is no electrophysiological evidence for cervical radiculopathy. Assessment:      Right carpal tunnel syndrome  Right shoulder biceps tendonitis  Chronic pain syndrome  Diabetes  GERD  Hypertension      Plan:      Natural history and expected course discussed. Questions answered. We discussed that there was no significant MRI evidence of nerve root compression or on EMG. The most significant finding was that he has moderate to severe carpal tunnel syndrome. On exam, provocative carpal tunnel test do not aggravate his symptoms. His neurologic symptoms have been present only since the beginning of the year.   Thus, I would recommend initial nonoperative management. Procedures    Radha Soni Titan Wrist Short Brace     Patient was prescribed a Radha Soni Titan Wrist Orthosis. The right wrist will require stabilization / immobilization from this semi-rigid / rigid orthosis to improve their function. The orthosis will assist in protecting the affected area, provide functional support and facilitate healing. The patient was educated and fit by a healthcare professional with expert knowledge and specialization in brace application while under the direct supervision of the treating physician. Verbal and written instructions for the use of and application of this item were provided. They were instructed to contact the office immediately should the brace result in increased pain, decreased sensation, increased swelling or worsening of the condition. He will wear the brace at night or with activity. Offered him a carpal tunnel injection which she declined at this time. Follow-up in 6 to 8 weeks or sooner if symptoms are worsening. This note was generated with use of a verbal recognition program (DRAGON) and was checked for errors. It is possible that there are still dictated errors within this office note. If so, please bring any errors to my attention for an addendum. All efforts were made to ensure that this office note is accurate. Washington Jensen.  Torri Hernandez MD  Orthopaedic Surgery and Sports Medicine

## 2023-01-03 NOTE — PROGRESS NOTES
[address]  1/4/23  Referring: Dr. Yamilet Grande CONSULT/CHIEF COMPLAINT/HPI     Reason for visit/ Chief complaint  Follow up  HTN, PE, DVT, SVT   HPI Percy Parry is a 46 y.o.  seen as a 2 year follow up for management of PE DVT, SVT borderline diabetes. CVA 2021 ( acute memory loss) Previous patient of Dr Cinthia Zimmer  Two previous back surgeries ( [id number]). He is a retired , , lives with his wife. Quit smoking 10 years ago. Father grandmother had CAD  He has lost 50 pounds by not drinking pop, over the past year. Also walks on treadmill at [employer] 2-3 times a week for 2 miles, this does not produce chest pain sob or dizziness. However  for the past year, he has had sharp chest pain. For the past month the chest pain  (described as a discomfort) has been radiating to his left neck, lasting one hour. Exacerbated by walking, no associated palpitations diaphoresis. Occasional lightheadedness and dizziness that occurs several times a day, (occurs while walking, sitting). Able to walk up a flight of stairs without stopping. No palpitations dizziness or syncope          Patient is adherent with medications and is tolerating them well without side effects     HISTORY/ALLERGIES/ROS     MedHx:  has a past medical history of Arthritis, Back problem, Chronic kidney disease, Diabetes mellitus ([facility] [address] Heart disease, Migraines, and Stroke ([facility] [address] SurgHx:  has a past surgical history that includes back surgery (2007); knee surgery (Right, 2011); and shoulder surgery (Right, 2011). SocHx:  reports that he quit smoking about 12 years ago. His smoking use included cigarettes. He has never used smokeless tobacco. He reports that he does not drink alcohol and does not use drugs. FamHx: No family history of premature coronary artery disease, sudden death, or aneurysm  Allergies: Patient has no known allergies.    ROS:   Review of Systems   Respiratory:  Positive for chest tightness and shortness of breath. All other systems reviewed and are negative. MEDICATIONS      Prior to Admission medications    Medication Sig Start Date End Date Taking? Authorizing Provider   predniSONE (DELTASONE) 20 MG tablet Take 40 mg by mouth daily 12/15/19  Yes Historical Provider, MD   methocarbamol (ROBAXIN) 750 MG tablet Take 750 mg by mouth 3 times daily  12/15/19  Yes Historical Provider, MD   XARELTO 20 MG TABS tablet Take 20 mg by mouth daily  1/21/20  Yes Historical Provider, MD   colchicine (COLCRYS) 0.6 MG tablet Take 1 tablet by mouth 2 times daily 4/22/18  Yes Historical Provider, MD   Omega-3 Fatty Acids (FISH OIL) 1000 MG CAPS Take 3,000 mg by mouth daily   Yes Historical Provider, MD   lisinopril (PRINIVIL;ZESTRIL) 20 MG tablet Take 20 mg by mouth daily   Yes Historical Provider, MD   ezetimibe (ZETIA) 10 MG tablet Take 10 mg by mouth daily   Yes Historical Provider, MD   hydrochlorothiazide (HYDRODIURIL) 12.5 MG tablet Take 12.5 mg by mouth daily   Yes Historical Provider, MD   aspirin 81 MG tablet Take 81 mg by mouth daily   Yes Historical Provider, MD   topiramate (TOPAMAX) 50 MG tablet Take 50 mg by mouth 2 times daily   Yes Historical Provider, MD   atorvastatin (LIPITOR) 10 MG tablet Take 5 mg by mouth every evening 2/9/18  Yes Historical Provider, MD   oxyCODONE (OXY-IR) 15 MG immediate release tablet Take 15 mg by mouth 3 times daily as needed. 2-3 times a day   Yes Historical Provider, MD   esomeprazole (NEXIUM) 40 MG delayed release capsule Take 1 capsule by mouth daily  Patient not taking: Reported on 1/4/2023    Historical Provider, MD   lidocaine (LIDODERM) 5 % Place 1 patch onto the skin daily 12 hours on, 12 hours off.   Patient not taking: Reported on 1/4/2023    Historical Provider, MD       PHYSICAL EXAM        Vitals:    01/04/23 0839   BP: 112/68   Pulse: 74   SpO2: 97%    Weight: 269 lb (122 kg)     Gen Alert, cooperative, no distress Heart  Regular rate and rhythm, no murmur   Head Normocephalic, atraumatic, no abnormalities Abd  Soft, NT, +BS, no mass, no OM   Eyes PERRLA, conj/corn clear Ext  Ext nl, AT, no C/C, no edema   Nose Nares normal, no drain age, Non-tender Pulse 2+ and symmetric   Throat Lips, mucosa, tongue normal Skin Color/text/turg nl, no rash/lesions   Neck S/S, TM, NT, no bruit Psych Nl mood and affect   Lung CTA-B, unlabored, no DTP     Ch wall NT, no deform       LABS and Imaging     Relevant and available CV data reviewed  Echo/MRI: 2018-Normal left ventricle size, wall thickness and systolic function with an   estimated ejection fraction of 55-60%. -There is abnormal septal motion. Diastolic filling parameters suggest grade   I diastolic dysfunction.   -Diastolic filling parameters suggest grade I diastolic dysfunction. E/e=   7.95.   -No evidence of significant valvular insufficiency.   -The right ventricle appears enlarged.   -Right ventricular systolic function appears normal.   -The right atrium appears mildly dilated. Cath: none  Holter:8/15/19-9/13/18  MCOT  NSR with PVC burden of less than 1%  VT noted for 14 beats at 124bpm. No AF. HR [id number]) symptoms reported with SR no symptoms with VT  EKG personally interpreted: Sinus  Rhythm   -RSR(V1) -nondiagnostic  Stress:  2010 myoview  2018  stress echo normal  Moderate Risk  Moderate Complexity/Medical Decision Making  Outside/Care everywhere records Reviewed  Labs Reviewed  Prior Imaging, ekg, cath, echo reviewed when available  Medications reviewed  Old Notes reviewed  ASSESSMENT AND PLAN     1.atypical possibly cardiac chest pain  - new problem  - differential: ischemic, msk, arrhythmia   Plan:  - evaluate with a stress echo    2 essential hypertension   - 112/68  - stable  Plan:  - lisinoril  - hctz    3.  Mixed Hyperlipidemia  - 10/24/19  Tc 130 tri 76 hdl 44 ldl 71 ast 47 ast 24  - 12/7/22  tc 146  tri 98 hdl 48 ldl 90    -stable  Plan  - continue lipitor 10  - zetia 10  - continue to monitor    4. Diabetes  - 12/7/22  HgA1c 5.7  Plan  - management per pcp    5. DVT/PE  - 1/2020 upper left Thigh- PTA, heparin, xarelto ( [place])  - 2018 MCOT- no AF  -stable  Plan  - continue xarelto (lifelong per ER doc)  - may stop fish oil  - Asa 81 mg      6. obesity  Plan:  - recently lost 50 pounds with diet and exercise      7.angiomyolipoma  - benign lesion  Plan  - followed by Dr. Natasha Castellano    Patient counseled on lifestyle modification, diet, and exercise. Follow Up: 6 months    Dr. Harrison Philip:  I, Katheryn Rodriguez, am scribing for and in the presence of [facility] DO Adelaide Eddy 01/04/23 9:16 AM       Cardiologist [address]    Physician Attestation  The scribe for and in the presence of me [employer] [facility], DO). The scribe Katheryn Rodriguez RN   may have prepopulated components of this note with my historical  intellectual property under my direct supervision. Any additions to this intellectual property were performed in my presence and at my direction. Furthermore, the content and accuracy of this note have been reviewed by me [facility], DO).   1/4/2023 9:09 AM

## 2023-01-04 ENCOUNTER — OFFICE VISIT (OUTPATIENT)
Dept: CARDIOLOGY CLINIC | Age: 53
End: 2023-01-04

## 2023-01-04 VITALS
WEIGHT: 269 LBS | OXYGEN SATURATION: 97 % | HEIGHT: 71 IN | HEART RATE: 74 BPM | BODY MASS INDEX: 37.66 KG/M2 | SYSTOLIC BLOOD PRESSURE: 112 MMHG | DIASTOLIC BLOOD PRESSURE: 68 MMHG

## 2023-01-04 DIAGNOSIS — I26.99 PULMONARY EMBOLISM, OTHER, UNSPECIFIED CHRONICITY, UNSPECIFIED WHETHER ACUTE COR PULMONALE PRESENT (HCC): ICD-10-CM

## 2023-01-04 DIAGNOSIS — I82.4Z9 DEEP VEIN THROMBOSIS (DVT) OF DISTAL VEIN OF LOWER EXTREMITY, UNSPECIFIED CHRONICITY, UNSPECIFIED LATERALITY (HCC): ICD-10-CM

## 2023-01-04 DIAGNOSIS — I47.1 SVT (SUPRAVENTRICULAR TACHYCARDIA) (HCC): ICD-10-CM

## 2023-01-04 DIAGNOSIS — E78.2 MIXED HYPERLIPIDEMIA: ICD-10-CM

## 2023-01-04 DIAGNOSIS — I25.9 CHEST PAIN DUE TO MYOCARDIAL ISCHEMIA, UNSPECIFIED ISCHEMIC CHEST PAIN TYPE: Primary | ICD-10-CM

## 2023-01-04 DIAGNOSIS — I10 ESSENTIAL HYPERTENSION: ICD-10-CM

## 2023-01-04 PROBLEM — I47.10 SVT (SUPRAVENTRICULAR TACHYCARDIA): Status: ACTIVE | Noted: 2023-01-04

## 2023-01-04 NOTE — LETTER
[facility]  [address] [address]  Phone: [phone]  Fax: [phone]    Mica Whitehead DO    January 22, 2023     [phone]    Patient: Micaela Dubose   MR Number: [id number]   YOB: 1970   Date of Visit: 1/4/2023       Dear Lucina Adorno:    Thank you for referring Micaela Dubose to me for evaluation/treatment. Below are the relevant portions of my assessment and plan of care. If you have questions, please do not hesitate to call me. I look forward to following Nancy Wilks along with you.     Sincerely,      Mica Whitehead DO

## 2023-01-22 ASSESSMENT — ENCOUNTER SYMPTOMS
SHORTNESS OF BREATH: 1
CHEST TIGHTNESS: 1

## 2023-06-28 ASSESSMENT — ENCOUNTER SYMPTOMS: SHORTNESS OF BREATH: 1

## 2023-06-30 PROBLEM — E78.2 MIXED HYPERLIPIDEMIA: Status: ACTIVE | Noted: 2019-08-13

## 2023-07-12 ENCOUNTER — OFFICE VISIT (OUTPATIENT)
Dept: CARDIOLOGY CLINIC | Age: 53
End: 2023-07-12

## 2023-07-12 ENCOUNTER — PROCEDURE VISIT (OUTPATIENT)
Dept: CARDIOLOGY CLINIC | Age: 53
End: 2023-07-12
Payer: COMMERCIAL

## 2023-07-12 VITALS
WEIGHT: 260.2 LBS | SYSTOLIC BLOOD PRESSURE: 100 MMHG | DIASTOLIC BLOOD PRESSURE: 62 MMHG | HEART RATE: 94 BPM | HEIGHT: 71 IN | BODY MASS INDEX: 36.43 KG/M2 | OXYGEN SATURATION: 98 %

## 2023-07-12 DIAGNOSIS — E78.2 MIXED HYPERLIPIDEMIA: ICD-10-CM

## 2023-07-12 DIAGNOSIS — R06.02 SOB (SHORTNESS OF BREATH): Primary | ICD-10-CM

## 2023-07-12 DIAGNOSIS — I26.99 PULMONARY EMBOLISM, OTHER, UNSPECIFIED CHRONICITY, UNSPECIFIED WHETHER ACUTE COR PULMONALE PRESENT (HCC): ICD-10-CM

## 2023-07-12 DIAGNOSIS — I10 ESSENTIAL HYPERTENSION: ICD-10-CM

## 2023-07-12 DIAGNOSIS — I47.1 SVT (SUPRAVENTRICULAR TACHYCARDIA) (HCC): ICD-10-CM

## 2023-07-12 DIAGNOSIS — I25.9 CHEST PAIN DUE TO MYOCARDIAL ISCHEMIA, UNSPECIFIED ISCHEMIC CHEST PAIN TYPE: ICD-10-CM

## 2023-07-12 DIAGNOSIS — I25.9 CHEST PAIN DUE TO MYOCARDIAL ISCHEMIA, UNSPECIFIED ISCHEMIC CHEST PAIN TYPE: Primary | ICD-10-CM

## 2023-07-12 DIAGNOSIS — I82.4Z9 DEEP VEIN THROMBOSIS (DVT) OF DISTAL VEIN OF LOWER EXTREMITY, UNSPECIFIED CHRONICITY, UNSPECIFIED LATERALITY (HCC): ICD-10-CM

## 2023-07-12 LAB
LV EF: 50 %
LVEF MODALITY: NORMAL

## 2023-07-12 PROCEDURE — 93320 DOPPLER ECHO COMPLETE: CPT | Performed by: INTERNAL MEDICINE

## 2023-07-12 PROCEDURE — 93325 DOPPLER ECHO COLOR FLOW MAPG: CPT | Performed by: INTERNAL MEDICINE

## 2023-07-12 PROCEDURE — 93351 STRESS TTE COMPLETE: CPT | Performed by: INTERNAL MEDICINE

## 2023-07-12 RX ORDER — PANTOPRAZOLE SODIUM 40 MG/1
TABLET, DELAYED RELEASE ORAL
COMMUNITY

## 2023-07-14 ASSESSMENT — ENCOUNTER SYMPTOMS: CHEST TIGHTNESS: 0

## 2023-10-24 ENCOUNTER — TELEPHONE (OUTPATIENT)
Dept: CARDIOLOGY CLINIC | Age: 53
End: 2023-10-24

## 2024-01-16 NOTE — PROGRESS NOTES
[facility]  24  Referring: Dr. Rey    REASON FOR CONSULT/CHIEF COMPLAINT/HPI     Reason for visit/ Chief complaint  Follow up  HTN, PE, DVT, SVT   HPI Darrell Lua is a 53 y.o.  seen as a 6 month follow up for management of PE DVT, SVT borderline diabetes. CVA  (acute memory loss) Previous patient of Dr Castro  Two previous back surgeries ( ,). He is not working due to back pain.He is a retired , , lives with his wife.    Quit smoking 10 years ago.Father grandmother had CAD      Today he states he is doing well.He walks on treadmill at [employer] 2-3 times a week for 2 miles with no issues.  He has no chest pain shortness of breath palpitations or dizziness. No syncope. No edema or orthopnea, enjoys going to [place] with grandchildren. 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  SurgHx:  has a past surgical history that includes back surgery (); knee surgery (Right, ); and shoulder surgery (Right, ).   SocHx:  reports that he quit smoking about 13 years ago. His smoking use included cigarettes. He has never used smokeless tobacco. He reports that he does not drink alcohol and does not use drugs.   FamHx: No family history of premature coronary artery disease, sudden death, or aneurysm  Allergies: Patient has no known allergies.   ROS:   Review of Systems   Respiratory:  Positive for shortness of breath. Negative for chest tightness.    All other systems reviewed and are negative.         MEDICATIONS      Prior to Admission medications    Medication Sig Start Date End Date Taking? Authorizing Provider   pantoprazole (PROTONIX) 40 MG 
negative.         MEDICATIONS      Prior to Admission medications    Medication Sig Start Date End Date Taking? Authorizing Provider   pantoprazole (PROTONIX) 40 MG tablet pantoprazole 40 mg tablet,delayed release   TAKE 1 TABLET BY MOUTH TWICE A DAY    Lashonda Stephens MD   predniSONE (DELTASONE) 20 MG tablet Take 2 tablets by mouth daily 12/15/19   Lashonda Stephens MD   methocarbamol (ROBAXIN) 750 MG tablet Take 1 tablet by mouth 3 times daily 12/15/19   Lashonda Stephens MD   lidocaine (LIDODERM) 5 % Place 1 patch onto the skin daily 12 hours on, 12 hours off.  Patient not taking: Reported on 7/12/2023    Lashonda Stephens MD   XARELTO 20 MG TABS tablet Take 1 tablet by mouth daily 1/21/20   Lashonda Stephens MD   colchicine (COLCRYS) 0.6 MG tablet Take 1 tablet by mouth in the morning and 1 tablet in the evening. 4/22/18   Lashonda Stephens MD   lisinopril (PRINIVIL;ZESTRIL) 20 MG tablet Take 1 tablet by mouth daily    Lashonda Stephens MD   ezetimibe (ZETIA) 10 MG tablet Take 1 tablet by mouth daily    Lashonda Stephens MD   hydrochlorothiazide (HYDRODIURIL) 12.5 MG tablet Take 1 tablet by mouth daily    Lashonda Stephens MD   aspirin 81 MG tablet Take 1 tablet by mouth daily    Lashonda Stephens MD   topiramate (TOPAMAX) 50 MG tablet Take 1 tablet by mouth 2 times daily    Lashonda Stephens MD   atorvastatin (LIPITOR) 10 MG tablet Take 0.5 tablets by mouth every evening 2/9/18   Lashonda Stephens MD   oxyCODONE (OXY-IR) 15 MG immediate release tablet Take 1 tablet by mouth 3 times daily as needed. 2-3 times a day    Lashonda Stephens MD       PHYSICAL EXAM        There were no vitals filed for this visit.           Gen Alert, cooperative, no distress Heart  Regular rate and rhythm, no murmur   Head Normocephalic, atraumatic, no abnormalities Abd  Soft, NT, +BS, no mass, no OM   Eyes PERRLA, conj/corn clear Ext  Ext nl, AT, no C/C, no edema   Nose Nares

## 2024-01-17 ENCOUNTER — OFFICE VISIT (OUTPATIENT)
Dept: CARDIOLOGY CLINIC | Age: 54
End: 2024-01-17
Payer: COMMERCIAL

## 2024-01-17 ENCOUNTER — HOSPITAL ENCOUNTER (OUTPATIENT)
Age: 54
Discharge: HOME | End: 2024-01-17
Payer: COMMERCIAL

## 2024-01-17 VITALS
SYSTOLIC BLOOD PRESSURE: 128 MMHG | WEIGHT: 254 LBS | HEIGHT: 71 IN | DIASTOLIC BLOOD PRESSURE: 72 MMHG | OXYGEN SATURATION: 99 % | HEART RATE: 62 BPM | BODY MASS INDEX: 35.56 KG/M2

## 2024-01-17 DIAGNOSIS — I82.4Z9 DEEP VEIN THROMBOSIS (DVT) OF DISTAL VEIN OF LOWER EXTREMITY, UNSPECIFIED CHRONICITY, UNSPECIFIED LATERALITY (HCC): ICD-10-CM

## 2024-01-17 DIAGNOSIS — I10 ESSENTIAL HYPERTENSION: ICD-10-CM

## 2024-01-17 DIAGNOSIS — I26.99 PULMONARY EMBOLISM, OTHER, UNSPECIFIED CHRONICITY, UNSPECIFIED WHETHER ACUTE COR PULMONALE PRESENT (HCC): Primary | ICD-10-CM

## 2024-01-17 DIAGNOSIS — I47.10 SVT (SUPRAVENTRICULAR TACHYCARDIA): ICD-10-CM

## 2024-01-17 DIAGNOSIS — R06.02 SOB (SHORTNESS OF BREATH): ICD-10-CM

## 2024-01-17 DIAGNOSIS — E78.2 MIXED HYPERLIPIDEMIA: ICD-10-CM

## 2024-01-17 LAB
CHOLEST SERPL-MCNC: 86 MG/DL (ref 0–199)
HDLC SERPL-MCNC: 43 MG/DL (ref 40–60)
LDLC SERPL CALC-MCNC: 32 MG/DL
TRIGL SERPL-MCNC: 54 MG/DL (ref 0–150)
VLDLC SERPL CALC-MCNC: 11 MG/DL

## 2024-01-17 PROCEDURE — 36415 COLL VENOUS BLD VENIPUNCTURE: CPT

## 2024-01-17 PROCEDURE — 3074F SYST BP LT 130 MM HG: CPT | Performed by: INTERNAL MEDICINE

## 2024-01-17 PROCEDURE — 99214 OFFICE O/P EST MOD 30 MIN: CPT | Performed by: INTERNAL MEDICINE

## 2024-01-17 PROCEDURE — 3078F DIAST BP <80 MM HG: CPT | Performed by: INTERNAL MEDICINE

## 2024-01-17 PROCEDURE — 80061 LIPID PANEL: CPT

## 2024-01-17 RX ORDER — ATORVASTATIN CALCIUM 10 MG/1
5 TABLET, FILM COATED ORAL EVERY EVENING
Qty: 90 TABLET | Refills: 3 | Status: SHIPPED | OUTPATIENT
Start: 2024-01-17

## 2024-01-17 RX ORDER — HYDROCHLOROTHIAZIDE 12.5 MG/1
12.5 TABLET ORAL DAILY
Qty: 90 TABLET | Refills: 3 | Status: SHIPPED | OUTPATIENT
Start: 2024-01-17

## 2024-01-17 RX ORDER — EZETIMIBE 10 MG/1
10 TABLET ORAL DAILY
Qty: 90 TABLET | Refills: 3 | Status: SHIPPED | OUTPATIENT
Start: 2024-01-17

## 2024-01-17 RX ORDER — LISINOPRIL 20 MG/1
20 TABLET ORAL DAILY
Qty: 90 TABLET | Refills: 3 | Status: SHIPPED | OUTPATIENT
Start: 2024-01-17

## 2024-06-17 DIAGNOSIS — K21.9 GASTRO-ESOPHAGEAL REFLUX DISEASE WITHOUT ESOPHAGITIS: ICD-10-CM

## 2024-06-17 RX ORDER — PANTOPRAZOLE SODIUM 40 MG/1
TABLET, DELAYED RELEASE ORAL
Qty: 180 TABLET | OUTPATIENT
Start: 2024-06-17

## 2024-06-17 NOTE — TELEPHONE ENCOUNTER
Requested Prescriptions     Pending Prescriptions Disp Refills    pantoprazole (PROTONIX) 40 MG tablet [Pharmacy Med Name: PANTOPRAZOLE SOD DR 40 MG TAB] 180 tablet      Sig: TAKE 1 TABLET (40 MG TOTAL) BY MOUTH IN THE MORNING AND 1 TABLET IN THE EVENING      [id number]      Last OV:  1/17/2024 DKW    Next OV: Visit date not found     Last Labs: 09/19/2023 [facility]    Last Filled: HISTORICAL PROVIDER

## 2024-07-26 NOTE — PROGRESS NOTES
[facility]      Unable to reach patient in person.  All Pre-operative instructions below left on [x] Voicemail/Number: [phone]    Date of Surgery/Procedure: 8/8/2024   Time: 0815  Arrival: 0645    1. You need a responsible adult 18 or older to stay on site while you are here, drive you home and stay with you for 24 hours. They must have permission to receive your post-op instructions  2. Nothing to eat or drink after midnight-including, gum, candy, mints or ice. If colonoscopy, follow prep instructions from office. No alcohol, smoking or marijuana in any form within 24 hours of procedure  3. If you normally take heart, blood pressure, seizure, breathing or thyroid medications in the morning please do so with a small sip of water. Use inhalers, bring rescue inhaler  4. GLP1 diabetic injections must have been taken at least 7 days prior to the procedure. Take half of your normal dose of any long-acting insulins the night before. Do not take any daily oral diabetic medications or give insulin correction doses the morning of procedure.  5.  Follow your doctors instructions regarding any prescribed blood thinners. Do not take any NSAIDs such as ibuprofen or naprosyn for 5-7 days prior.  6.  Bring a complete list of all your medications (prescriptions and over the counter), picture ID and insurance card. Leave all other valuables at home  7.  Follow any instructions your surgeon's office has given you. Call your surgeon's office if you need to cancel for illness or any reason or for any further questions  8.  Shower with antibacterial soap or as directed by surgeon. Do not put anything on skin after including lotion or powders  9.  Wear loose, comfortable clothing. No jewelry, piercings or metal hair clips. No makeup or nail polish  10. Bring any assistive or medical devices that you may need and any the surgeon has advised are needed post operatively. If

## 2024-08-07 ENCOUNTER — ANESTHESIA EVENT (OUTPATIENT)
Age: 54
End: 2024-08-07
Payer: COMMERCIAL

## 2024-08-08 ENCOUNTER — ANESTHESIA (OUTPATIENT)
Age: 54
End: 2024-08-08
Payer: COMMERCIAL

## 2024-08-08 ENCOUNTER — HOSPITAL ENCOUNTER (OUTPATIENT)
Age: 54
Setting detail: OUTPATIENT SURGERY
Discharge: HOME | End: 2024-08-08
Attending: INTERNAL MEDICINE | Admitting: INTERNAL MEDICINE
Payer: COMMERCIAL

## 2024-08-08 VITALS
TEMPERATURE: 97.6 F | BODY MASS INDEX: 34.3 KG/M2 | WEIGHT: 245 LBS | HEART RATE: 68 BPM | RESPIRATION RATE: 18 BRPM | DIASTOLIC BLOOD PRESSURE: 78 MMHG | SYSTOLIC BLOOD PRESSURE: 102 MMHG | OXYGEN SATURATION: 99 % | HEIGHT: 71 IN

## 2024-08-08 DIAGNOSIS — K62.5 RECTAL BLEED: ICD-10-CM

## 2024-08-08 LAB — GLUCOSE BLD-MCNC: 92 MG/DL (ref 70–99)

## 2024-08-08 PROCEDURE — 82962 GLUCOSE BLOOD TEST: CPT

## 2024-08-08 PROCEDURE — 3609013500 HC EGD REMOVAL TUMOR POLYP/OTHER LESION SNARE TECH: Performed by: INTERNAL MEDICINE

## 2024-08-08 PROCEDURE — 3609010300 HC COLONOSCOPY W/BIOPSY SINGLE/MULTIPLE: Performed by: INTERNAL MEDICINE

## 2024-08-08 PROCEDURE — 7100000011 HC PHASE II RECOVERY - ADDTL 15 MIN: Performed by: INTERNAL MEDICINE

## 2024-08-08 PROCEDURE — 2580000003 HC RX 258

## 2024-08-08 PROCEDURE — 3700000000 HC ANESTHESIA ATTENDED CARE: Performed by: INTERNAL MEDICINE

## 2024-08-08 PROCEDURE — 7100000010 HC PHASE II RECOVERY - FIRST 15 MIN: Performed by: INTERNAL MEDICINE

## 2024-08-08 PROCEDURE — 3700000001 HC ADD 15 MINUTES (ANESTHESIA): Performed by: INTERNAL MEDICINE

## 2024-08-08 PROCEDURE — 88305 TISSUE EXAM BY PATHOLOGIST: CPT

## 2024-08-08 PROCEDURE — 6360000002 HC RX W HCPCS

## 2024-08-08 PROCEDURE — 2709999900 HC NON-CHARGEABLE SUPPLY: Performed by: INTERNAL MEDICINE

## 2024-08-08 PROCEDURE — 2580000003 HC RX 258: Performed by: ANESTHESIOLOGY

## 2024-08-08 RX ORDER — DROPERIDOL 2.5 MG/ML
0.62 INJECTION, SOLUTION INTRAMUSCULAR; INTRAVENOUS
Status: DISCONTINUED | OUTPATIENT
Start: 2024-08-08 | End: 2024-08-08 | Stop reason: HOSPADM

## 2024-08-08 RX ORDER — SODIUM CHLORIDE 0.9 % (FLUSH) 0.9 %
5-40 SYRINGE (ML) INJECTION EVERY 12 HOURS SCHEDULED
Status: DISCONTINUED | OUTPATIENT
Start: 2024-08-08 | End: 2024-08-08 | Stop reason: HOSPADM

## 2024-08-08 RX ORDER — ONDANSETRON 2 MG/ML
4 INJECTION INTRAMUSCULAR; INTRAVENOUS
Status: DISCONTINUED | OUTPATIENT
Start: 2024-08-08 | End: 2024-08-08 | Stop reason: HOSPADM

## 2024-08-08 RX ORDER — SODIUM CHLORIDE 0.9 % (FLUSH) 0.9 %
5-40 SYRINGE (ML) INJECTION PRN
Status: DISCONTINUED | OUTPATIENT
Start: 2024-08-08 | End: 2024-08-08 | Stop reason: HOSPADM

## 2024-08-08 RX ORDER — LIDOCAINE HYDROCHLORIDE 20 MG/ML
INJECTION, SOLUTION INTRAVENOUS PRN
Status: DISCONTINUED | OUTPATIENT
Start: 2024-08-08 | End: 2024-08-08 | Stop reason: SDUPTHER

## 2024-08-08 RX ORDER — TIRZEPATIDE 2.5 MG/.5ML
2.5 INJECTION, SOLUTION SUBCUTANEOUS WEEKLY
COMMUNITY

## 2024-08-08 RX ORDER — PROPOFOL 10 MG/ML
INJECTION, EMULSION INTRAVENOUS PRN
Status: DISCONTINUED | OUTPATIENT
Start: 2024-08-08 | End: 2024-08-08 | Stop reason: SDUPTHER

## 2024-08-08 RX ORDER — SODIUM CHLORIDE 9 MG/ML
INJECTION, SOLUTION INTRAVENOUS PRN
Status: DISCONTINUED | OUTPATIENT
Start: 2024-08-08 | End: 2024-08-08 | Stop reason: HOSPADM

## 2024-08-08 RX ORDER — SODIUM CHLORIDE 9 MG/ML
INJECTION, SOLUTION INTRAVENOUS CONTINUOUS PRN
Status: DISCONTINUED | OUTPATIENT
Start: 2024-08-08 | End: 2024-08-08 | Stop reason: SDUPTHER

## 2024-08-08 RX ORDER — NALOXONE HYDROCHLORIDE 0.4 MG/ML
INJECTION, SOLUTION INTRAMUSCULAR; INTRAVENOUS; SUBCUTANEOUS PRN
Status: DISCONTINUED | OUTPATIENT
Start: 2024-08-08 | End: 2024-08-08 | Stop reason: HOSPADM

## 2024-08-08 RX ADMIN — PROPOFOL 200 MCG/KG/MIN: 10 INJECTION, EMULSION INTRAVENOUS at 08:38

## 2024-08-08 RX ADMIN — SODIUM CHLORIDE: 9 INJECTION, SOLUTION INTRAVENOUS at 08:32

## 2024-08-08 RX ADMIN — SODIUM CHLORIDE 2000 ML: 9 INJECTION, SOLUTION INTRAVENOUS at 07:49

## 2024-08-08 RX ADMIN — PROPOFOL 60 MG: 10 INJECTION, EMULSION INTRAVENOUS at 08:37

## 2024-08-08 RX ADMIN — LIDOCAINE HYDROCHLORIDE 100 MG: 20 INJECTION, SOLUTION INTRAVENOUS at 08:37

## 2024-08-08 ASSESSMENT — PAIN - FUNCTIONAL ASSESSMENT
PAIN_FUNCTIONAL_ASSESSMENT: 0-10

## 2024-08-08 ASSESSMENT — PAIN DESCRIPTION - DESCRIPTORS: DESCRIPTORS: ACHING

## 2024-08-08 ASSESSMENT — ENCOUNTER SYMPTOMS: SHORTNESS OF BREATH: 0

## 2024-08-08 NOTE — ANESTHESIA PRE PROCEDURE
Department of Anesthesiology  Preprocedure Note       Name:  Darrell Lua   Age:  54 y.o.  :  1970                                          MRN:  [id number]         Date:  2024      Surgeon: Surgeon(s):  Saroj Gandara DO    Procedure: Procedure(s):  COLONOSCOPY  ESOPHAGOGASTRODUODENOSCOPY    Medications prior to admission:   Prior to Admission medications    Medication Sig Start Date End Date Taking? Authorizing Provider   lisinopril (PRINIVIL;ZESTRIL) 20 MG tablet Take 1 tablet by mouth daily 24   Saroj Garcia DO   hydroCHLOROthiazide (HYDRODIURIL) 12.5 MG tablet Take 1 tablet by mouth daily 24   Saroj Garcia DO   ezetimibe (ZETIA) 10 MG tablet Take 1 tablet by mouth daily 24   Saroj Garcia DO   atorvastatin (LIPITOR) 10 MG tablet Take 0.5 tablets by mouth every evening 24   Saroj Garcia DO   pantoprazole (PROTONIX) 40 MG tablet pantoprazole 40 mg tablet,delayed release   TAKE 1 TABLET BY MOUTH TWICE A DAY    Lashonda Stephens MD   methocarbamol (ROBAXIN) 750 MG tablet Take 1 tablet by mouth 3 times daily 12/15/19   Lashonda Stephens MD   XARELTO 20 MG TABS tablet Take 1 tablet by mouth daily 20   Lashonda Stephens MD   colchicine (COLCRYS) 0.6 MG tablet Take 1 tablet by mouth in the morning and 1 tablet in the evening. 18   Lashonda Stephens MD   aspirin 81 MG tablet Take 1 tablet by mouth daily    Lashonda Stephens MD   topiramate (TOPAMAX) 50 MG tablet Take 1 tablet by mouth 2 times daily    Lashonda Stephens MD   oxyCODONE (OXY-IR) 15 MG immediate release tablet Take 1 tablet by mouth 3 times daily as needed. 2-3 times a day    Lashonda Stephens MD       Current medications:    No current facility-administered medications for this encounter.     Current Outpatient Medications   Medication Sig Dispense Refill    lisinopril (PRINIVIL;ZESTRIL) 20 MG tablet Take 1 tablet by mouth daily 90 tablet 3

## 2024-08-08 NOTE — PERIOP NOTE
Discharge instructions review with patient and zorany. All home medications have been reviewed, pt v/u. Pt discharged via wheelchair with Zaynab HERRERA. Pt discharged with all belongings. Spouse taking stable pt home. Care complete

## 2024-08-08 NOTE — PROGRESS NOTES
[facility] has verified that procedural consent is correctly filled out prior to the start of the procedure.  Ana HERRERA,  Kenzie RN

## 2024-08-08 NOTE — H&P
tablets by mouth every evening 1/17/24   Saroj Garcia,    pantoprazole (PROTONIX) 40 MG tablet pantoprazole 40 mg tablet,delayed release   TAKE 1 TABLET BY MOUTH TWICE A DAY    Lashonda Stephens MD   methocarbamol (ROBAXIN) 750 MG tablet Take 1 tablet by mouth 3 times daily 12/15/19   Lashonda Stephens MD   XARELTO 20 MG TABS tablet Take 1 tablet by mouth daily 1/21/20   Lashonda Stephens MD   colchicine (COLCRYS) 0.6 MG tablet Take 1 tablet by mouth in the morning and 1 tablet in the evening. 4/22/18   Lashonda Stephens MD   aspirin 81 MG tablet Take 1 tablet by mouth daily    Lashonda Stephens MD   topiramate (TOPAMAX) 50 MG tablet Take 1 tablet by mouth 2 times daily    Lashonda Stephens MD   oxyCODONE (OXY-IR) 15 MG immediate release tablet Take 1 tablet by mouth 3 times daily as needed. 2-3 times a day    Lashonda Stephens MD       Review of Systems:  Weight Loss: No  Dysphagia: No  Dyspepsia: No    Physical Exam:   Vital Signs: There were no vitals taken for this visit.  Vital signs reviewed:Yes    HEENT:Normal  Cardiac:Normal  Chest:Normal  Abdomen:Normal  Exts: Normal  Neuro:Normal    Labs:  No visits with results within 12 Week(s) from this visit.   Latest known visit with results is:   Hospital Outpatient Visit on 01/17/2024   Component Date Value Ref Range Status    Cholesterol, Total 01/17/2024 86  0 - 199 mg/dL Final    Triglycerides 01/17/2024 54  0 - 150 mg/dL Final    HDL 01/17/2024 43  40 - 60 mg/dL Final    LDL Calculated 01/17/2024 32  <100 mg/dL Final    VLDL Cholesterol Calculated 01/17/2024 11  Not Established mg/dL Final        Imaging:  No orders to display       ASA:2    Mallampati Score: III    Sedation planned:MAC    Patient in acceptable condition for procedure:Yes    9:10 PM 8/7/2024    Saroj Gandara, DO      Please note that some or all of this record was generated using voice recognition software. If there are any questions about the content of this

## 2024-08-08 NOTE — PROGRESS NOTES
Pt arrived from OR to [facility] room 1. Reported received from OR staff, Wili. Pt is arousable to voice, by family. No surgical incisions dressings in place to assess. Pt on RA, NSR, and VSS. Will continue to monitor.

## 2024-08-08 NOTE — ANESTHESIA POSTPROCEDURE EVALUATION
Department of Anesthesiology  Postprocedure Note    Patient: Darrell Lua  MRN: [id number]  YOB: 1970  Date of evaluation: 8/8/2024    Procedure Summary       Date: 08/08/24 Room / Location: [address]    Anesthesia Start: 0832 Anesthesia Stop: 0921    Procedures:       COLONOSCOPY BIOPSY      ESOPHAGOGASTRODUODENOSCOPY POLYP SNARE Diagnosis:       Rectal bleed      (Rectal bleed [K62.5])    Surgeons: Saroj Gandara DO Responsible Provider: Oumar Lanier MD    Anesthesia Type: MAC ASA Status: 3            Anesthesia Type: MAC    Giuseppe Phase I: Giuseppe Score: 10    Giuseppe Phase II: Giuseppe Score: 8    Anesthesia Post Evaluation    Patient location during evaluation: PACU  Patient participation: complete - patient participated  Level of consciousness: awake  Airway patency: patent  Nausea & Vomiting: no nausea and no vomiting  Cardiovascular status: blood pressure returned to baseline  Respiratory status: acceptable  Hydration status: stable  Comments: Vital signs stable  OK to discharge from Stage I post anesthesia care.  Care transferred from Anesthesiology department on discharge from perioperative area   Multimodal analgesia pain management approach  Pain management: satisfactory to patient    No notable events documented.

## 2024-08-08 NOTE — DISCHARGE INSTRUCTIONS

## 2024-08-09 LAB — SURGICAL PATHOLOGY REPORT: NORMAL

## 2024-12-02 RX ORDER — LISINOPRIL 20 MG/1
20 TABLET ORAL DAILY
Qty: 90 TABLET | Refills: 3 | Status: SHIPPED | OUTPATIENT
Start: 2024-12-02

## 2024-12-02 RX ORDER — HYDROCHLOROTHIAZIDE 12.5 MG/1
12.5 TABLET ORAL DAILY
Qty: 90 TABLET | Refills: 3 | Status: SHIPPED | OUTPATIENT
Start: 2024-12-02

## 2024-12-02 NOTE — TELEPHONE ENCOUNTER
Requested Prescriptions     Pending Prescriptions Disp Refills    hydroCHLOROthiazide 12.5 MG tablet [Pharmacy Med Name: HYDROCHLOROTHIAZIDE 12.5 MG TB] 90 tablet 3     Sig: TAKE 1 TABLET BY MOUTH EVERY DAY    lisinopril (PRINIVIL;ZESTRIL) 20 MG tablet [Pharmacy Med Name: LISINOPRIL 20 MG TABLET] 90 tablet 3     Sig: TAKE 1 TABLET BY MOUTH EVERY DAY      [facility]/pharmacy #[id number] - [place] [address] [facility] - [place] [phone] - F [phone]          LOV-1/17/24 Nov-1/16/25  Lmr-1/17/24

## 2025-01-15 ASSESSMENT — ENCOUNTER SYMPTOMS
CHEST TIGHTNESS: 0
SHORTNESS OF BREATH: 1

## 2025-01-15 NOTE — PROGRESS NOTES
[facility]  1/15/25  Referring: Dr. Rey    REASON FOR CONSULT/CHIEF COMPLAINT/HPI     Reason for visit/ Chief complaint  Follow up  HTN, PE, DVT, SVT   HPI Darrell Lua is a 54 y.o.  seen as a 6 month follow up for management of PE DVT, SVT borderline diabetes. CVA  (acute memory loss) Previous patient of Dr Castro  Two previous back surgeries ( ,). He is not working due to back pain.He is a retired , , lives with his wife.    Quit smoking 10 years ago.Father grandmother had CAD.    He walks on treadmill at [employer] 2-3 times a week for 2 miles with no issues.     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    Today, he    Patient is adherent with medications and is tolerating them well without side effects     HISTORY/ALLERGIES/ROS     MedHx:  has a past medical history of Arthritis, Back problem, Chronic kidney disease, Diabetes mellitus (HCC), Heart disease, Migraines, and Stroke (HCC).  SurgHx:  has a past surgical history that includes back surgery (); knee surgery (Right, ); shoulder surgery (Right, ); Colonoscopy (N/A, 2024); and Upper gastrointestinal endoscopy (N/A, 2024).   SocHx:  reports that he quit smoking about 14 years ago. His smoking use included cigarettes. He has never used smokeless tobacco. He reports that he does not drink alcohol and does not use drugs.   FamHx: No family history of premature coronary artery disease, sudden death, or aneurysm  Allergies: Patient has no known allergies.   ROS:   Review of Systems   Respiratory:  Positive for shortness of breath. Negative for chest tightness.    All other systems reviewed and are negative.         MEDICATIONS      Prior to Admission medications    Medication Sig Start Date End Date Taking? Authorizing Provider   hydroCHLOROthiazide 12.5 MG tablet TAKE 1 TABLET BY MOUTH EVERY DAY 24   Saroj Garcia, DO   lisinopril

## 2025-01-16 ENCOUNTER — OFFICE VISIT (OUTPATIENT)
Dept: CARDIOLOGY CLINIC | Age: 55
End: 2025-01-16
Payer: COMMERCIAL

## 2025-01-16 VITALS
HEIGHT: 71 IN | OXYGEN SATURATION: 98 % | BODY MASS INDEX: 35.28 KG/M2 | WEIGHT: 252 LBS | HEART RATE: 55 BPM | SYSTOLIC BLOOD PRESSURE: 104 MMHG | DIASTOLIC BLOOD PRESSURE: 60 MMHG

## 2025-01-16 DIAGNOSIS — E66.813 CLASS 3 SEVERE OBESITY IN ADULT, UNSPECIFIED BMI, UNSPECIFIED OBESITY TYPE, UNSPECIFIED WHETHER SERIOUS COMORBIDITY PRESENT: ICD-10-CM

## 2025-01-16 DIAGNOSIS — E66.01 CLASS 3 SEVERE OBESITY IN ADULT, UNSPECIFIED BMI, UNSPECIFIED OBESITY TYPE, UNSPECIFIED WHETHER SERIOUS COMORBIDITY PRESENT: ICD-10-CM

## 2025-01-16 DIAGNOSIS — I26.99 PULMONARY EMBOLISM, OTHER, UNSPECIFIED CHRONICITY, UNSPECIFIED WHETHER ACUTE COR PULMONALE PRESENT (HCC): Primary | ICD-10-CM

## 2025-01-16 DIAGNOSIS — I47.10 SVT (SUPRAVENTRICULAR TACHYCARDIA) (HCC): ICD-10-CM

## 2025-01-16 DIAGNOSIS — E78.2 MIXED HYPERLIPIDEMIA: ICD-10-CM

## 2025-01-16 DIAGNOSIS — I10 ESSENTIAL HYPERTENSION: ICD-10-CM

## 2025-01-16 DIAGNOSIS — I63.9 CEREBROVASCULAR ACCIDENT (CVA), UNSPECIFIED MECHANISM (HCC): ICD-10-CM

## 2025-01-16 PROCEDURE — 99214 OFFICE O/P EST MOD 30 MIN: CPT | Performed by: INTERNAL MEDICINE

## 2025-01-16 PROCEDURE — 3074F SYST BP LT 130 MM HG: CPT | Performed by: INTERNAL MEDICINE

## 2025-01-16 PROCEDURE — 3078F DIAST BP <80 MM HG: CPT | Performed by: INTERNAL MEDICINE

## 2025-01-16 RX ORDER — ATORVASTATIN CALCIUM 10 MG/1
5 TABLET, FILM COATED ORAL EVERY EVENING
Qty: 90 TABLET | Refills: 3 | Status: SHIPPED | OUTPATIENT
Start: 2025-01-16

## 2025-01-16 RX ORDER — HYDROCHLOROTHIAZIDE 12.5 MG/1
12.5 TABLET ORAL DAILY
Qty: 90 TABLET | Refills: 3 | Status: SHIPPED | OUTPATIENT
Start: 2025-01-16

## 2025-01-16 RX ORDER — LISINOPRIL 5 MG/1
20 TABLET ORAL DAILY
Qty: 90 TABLET | Refills: 3 | Status: SHIPPED | OUTPATIENT
Start: 2025-01-16

## 2025-01-16 RX ORDER — EZETIMIBE 10 MG/1
10 TABLET ORAL DAILY
Qty: 90 TABLET | Refills: 3 | Status: SHIPPED | OUTPATIENT
Start: 2025-01-16

## 2025-01-16 ASSESSMENT — ENCOUNTER SYMPTOMS
SHORTNESS OF BREATH: 1
CHEST TIGHTNESS: 0

## 2025-01-16 NOTE — PATIENT INSTRUCTIONS
Decrease lisinopril to 5 mg daily  Call for worsening dizziness  Call if you need to have any surgery on your knee

## 2025-01-16 NOTE — PROGRESS NOTES
[facility] [place]  1/16/25  Referring: Dr. Rey    REASON FOR CONSULT/CHIEF COMPLAINT/HPI     Reason for visit/ Chief complaint  Follow up  HTN, PE, DVT, SVT   HPI Darrell Lua is a 54 y.o.  seen as a 6 month follow up for management of PE DVT, SVT borderline diabetes. CVA 2021 (acute memory loss) Previous patient of Dr Castro  Two previous back surgeries ( 2000,2008). He is not working due to back pain.He is a retired , , lives with his wife.  Quit smoking 10 years ago.Father grandmother had CAD  In the interval since his last visit he has lost 60-80 pounds with diet and exercise    Today, he complains of feeling lightheaded if he stands up to quickly.  No chest pain shortness of breath palpitations. No syncopal episodes. He is walking for exercise. Will be getting MRI left knee next week. He is able to walk up a flight of stairs without stopping, only limited by knee pain    He is looking forward to a trip at [place]    Patient is adherent with medications and is tolerating them well without side effects     HISTORY/ALLERGIES/ROS     MedHx:  has a past medical history of Arthritis, Back problem, Chronic kidney disease, Diabetes mellitus (HCC), Heart disease, Migraines, and Stroke (HCC).  SurgHx:  has a past surgical history that includes back surgery (2007); knee surgery (Right, 2011); shoulder surgery (Right, 2011); Colonoscopy (N/A, 8/8/2024); and Upper gastrointestinal endoscopy (N/A, 8/8/2024).   SocHx:  reports that he quit smoking about 14 years ago. His smoking use included cigarettes. He has never used smokeless tobacco. He reports that he does not drink alcohol and does not use drugs.   FamHx: No family history of premature coronary artery disease, sudden death, or aneurysm  Allergies: Patient has no known allergies.   ROS:   Review of Systems   Respiratory:  Positive for shortness of breath. Negative for chest tightness.    All other systems reviewed and are

## 2025-05-12 NOTE — TELEPHONE ENCOUNTER
Received refill request for LISINOPRIL 5 MG TABLET  from [facility]  pharmacy.     Last OV: 1/16/25    Next OV: none     Last Labs: EKG 1/5/23    Last Filled: 1/16/25

## 2025-05-13 ENCOUNTER — TELEPHONE (OUTPATIENT)
Dept: CARDIOLOGY CLINIC | Age: 55
End: 2025-05-13

## 2025-05-13 DIAGNOSIS — I10 ESSENTIAL HYPERTENSION: Primary | ICD-10-CM

## 2025-05-14 NOTE — TELEPHONE ENCOUNTER
LMOM for patient to return call in regards to message below, and MyChart message sent as it appears pt does use MyChart.

## 2025-05-16 RX ORDER — LISINOPRIL 5 MG/1
20 TABLET ORAL DAILY
Qty: 120 TABLET | Refills: 0 | Status: SHIPPED | OUTPATIENT
Start: 2025-05-16

## 2025-06-24 RX ORDER — LISINOPRIL 5 MG/1
20 TABLET ORAL DAILY
Qty: 120 TABLET | OUTPATIENT
Start: 2025-06-24

## (undated) DEVICE — SOLUTION IRRIG 1000ML STRL H2O USP PLAS POUR BTL

## (undated) DEVICE — ENDOSCOPIC KIT 1.1+ 6 FT 2 GWN AAMI LEVEL 3

## (undated) DEVICE — FORCEPS BX L240CM WRK CHN 2.8MM STD CAP W/ NDL MIC MESH

## (undated) DEVICE — CONMED SCOPE SAVER BITE BLOCK, 20X27 MM: Brand: SCOPE SAVER

## (undated) DEVICE — TRAP SPEC POLYP REM STRNR CLN DSGN MAGNIFYING WIND DISP

## (undated) DEVICE — SYRINGE,LUER LOCK,STERILE,60ML,PUMPC: Brand: MEDLINE

## (undated) DEVICE — SINGLE USE AIR/WATER, SUCTION AND BIOPSY VALVES SET: Brand: ORCAPOD™

## (undated) DEVICE — AIR/WATER CLEANING ADAPTER FOR OLYMPUS® GI ENDOSCOPE: Brand: BULLDOG®

## (undated) DEVICE — TUBING, SUCTION, 1/4" X 12', STRAIGHT: Brand: MEDLINE

## (undated) DEVICE — SNARE COLD DIAMOND 10MM THIN

## (undated) DEVICE — BRUSH CLN L220CM DIA5MM ZAH DISP FOR WRK CHAN DIA2.8/4.2MM

## (undated) DEVICE — BW-412T DISP COMBO CLEANING BRUSH: Brand: SINGLE USE COMBINATION CLEANING BRUSH